# Patient Record
Sex: MALE | URBAN - METROPOLITAN AREA
[De-identification: names, ages, dates, MRNs, and addresses within clinical notes are randomized per-mention and may not be internally consistent; named-entity substitution may affect disease eponyms.]

---

## 2020-08-06 ENCOUNTER — APPOINTMENT (RX ONLY)
Dept: URBAN - METROPOLITAN AREA CLINIC 374 | Facility: CLINIC | Age: 40
Setting detail: DERMATOLOGY
End: 2020-08-06

## 2020-08-06 DIAGNOSIS — L20.89 OTHER ATOPIC DERMATITIS: ICD-10-CM

## 2020-08-06 PROCEDURE — ? COUNSELING

## 2020-08-06 PROCEDURE — ? PRESCRIPTION MEDICATION MANAGEMENT

## 2020-08-06 PROCEDURE — ? PRESCRIPTION

## 2020-08-06 PROCEDURE — 99202 OFFICE O/P NEW SF 15 MIN: CPT

## 2020-08-06 RX ORDER — TRIAMCINOLONE ACETONIDE 1 MG/G
1 OINTMENT TOPICAL BID
Qty: 1 | Refills: 3 | Status: ERX | COMMUNITY
Start: 2020-08-06

## 2020-08-06 RX ORDER — AMMONIUM LACTATE 12 G/100G
1 LOTION TOPICAL BID
Qty: 1 | Refills: 2 | Status: ERX | COMMUNITY
Start: 2020-08-06

## 2020-08-06 RX ADMIN — TRIAMCINOLONE ACETONIDE 1: 1 OINTMENT TOPICAL at 00:00

## 2020-08-06 RX ADMIN — AMMONIUM LACTATE 1: 12 LOTION TOPICAL at 00:00

## 2020-08-06 ASSESSMENT — LOCATION ZONE DERM: LOCATION ZONE: LEG

## 2020-08-06 ASSESSMENT — LOCATION SIMPLE DESCRIPTION DERM
LOCATION SIMPLE: LEFT PRETIBIAL REGION
LOCATION SIMPLE: RIGHT PRETIBIAL REGION

## 2020-08-06 ASSESSMENT — LOCATION DETAILED DESCRIPTION DERM
LOCATION DETAILED: LEFT PROXIMAL PRETIBIAL REGION
LOCATION DETAILED: RIGHT DISTAL PRETIBIAL REGION
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
## 2020-08-06 NOTE — PROCEDURE: PRESCRIPTION MEDICATION MANAGEMENT
Initiate Treatment: TAC 0.1% ointment bid to lower legs\\nAmmonium lactate 12% lotion bid to legs after TAC ointment
Detail Level: Simple
Render In Strict Bullet Format?: No

## 2020-10-15 ENCOUNTER — APPOINTMENT (RX ONLY)
Dept: URBAN - METROPOLITAN AREA CLINIC 374 | Facility: CLINIC | Age: 40
Setting detail: DERMATOLOGY
End: 2020-10-15

## 2020-10-15 DIAGNOSIS — L20.89 OTHER ATOPIC DERMATITIS: ICD-10-CM | Status: IMPROVED

## 2020-10-15 PROCEDURE — ? PRESCRIPTION MEDICATION MANAGEMENT

## 2020-10-15 PROCEDURE — 99213 OFFICE O/P EST LOW 20 MIN: CPT

## 2020-10-15 PROCEDURE — ? PHOTO-DOCUMENTATION

## 2020-10-15 PROCEDURE — ? PRESCRIPTION

## 2020-10-15 PROCEDURE — ? MEDICATION COUNSELING

## 2020-10-15 RX ORDER — TACROLIMUS 1 MG/G
OINTMENT TOPICAL BID
Qty: 1 | Refills: 3 | Status: ERX | COMMUNITY
Start: 2020-10-15

## 2020-10-15 RX ADMIN — TACROLIMUS: 1 OINTMENT TOPICAL at 00:00

## 2020-10-15 ASSESSMENT — LOCATION DETAILED DESCRIPTION DERM
LOCATION DETAILED: LEFT PROXIMAL PRETIBIAL REGION
LOCATION DETAILED: RIGHT DISTAL PRETIBIAL REGION

## 2020-10-15 ASSESSMENT — LOCATION SIMPLE DESCRIPTION DERM
LOCATION SIMPLE: RIGHT PRETIBIAL REGION
LOCATION SIMPLE: LEFT PRETIBIAL REGION

## 2020-10-15 ASSESSMENT — LOCATION ZONE DERM: LOCATION ZONE: LEG

## 2020-10-15 NOTE — PROCEDURE: MEDICATION COUNSELING
Xellachellez Pregnancy And Lactation Text: This medication is Pregnancy Category D and is not considered safe during pregnancy.  The risk during breast feeding is also uncertain.

## 2020-10-15 NOTE — PROCEDURE: PRESCRIPTION MEDICATION MANAGEMENT
Continue Regimen: TAC 0.1% ointment to lower legs prn worsening of eczema \\nAmmonium lactate 12% lotion bid to legs after TAC ointment
Initiate Treatment: Protopic 0.1% topical ointment bid to the eczema of legs
Detail Level: Simple
Render In Strict Bullet Format?: No

## 2020-10-15 NOTE — PROCEDURE: MEDICATION COUNSELING
24 Cimzia Pregnancy And Lactation Text: This medication crosses the placenta but can be considered safe in certain situations. Cimzia may be excreted in breast milk.

## 2020-10-15 NOTE — PROCEDURE: MEDICATION COUNSELING
80 Dupixent Counseling: I discussed with the patient the risks of dupilumab including but not limited to eye infection and irritation, cold sores, injection site reactions, worsening of asthma, allergic reactions and increased risk of parasitic infection.  Live vaccines should be avoided while taking dupilumab. Dupilumab will also interact with certain medications such as warfarin and cyclosporine. The patient understands that monitoring is required and they must alert us or the primary physician if symptoms of infection or other concerning signs are noted.

## 2020-10-21 ENCOUNTER — RX ONLY (OUTPATIENT)
Age: 40
Setting detail: RX ONLY
End: 2020-10-21

## 2020-10-21 RX ORDER — CRISABOROLE 20 MG/G
OINTMENT TOPICAL BID
Qty: 1 | Refills: 3 | Status: ERX | COMMUNITY
Start: 2020-10-21

## 2020-10-22 ENCOUNTER — RX ONLY (OUTPATIENT)
Age: 40
Setting detail: RX ONLY
End: 2020-10-22

## 2020-10-22 RX ORDER — PIMECROLIMUS 10 MG/G
CREAM TOPICAL
Qty: 1 | Refills: 3 | Status: ERX | COMMUNITY
Start: 2020-10-22

## 2021-03-18 ENCOUNTER — APPOINTMENT (RX ONLY)
Dept: URBAN - METROPOLITAN AREA CLINIC 374 | Facility: CLINIC | Age: 41
Setting detail: DERMATOLOGY
End: 2021-03-18

## 2021-03-18 DIAGNOSIS — L20.89 OTHER ATOPIC DERMATITIS: ICD-10-CM | Status: IMPROVED

## 2021-03-18 PROCEDURE — 99213 OFFICE O/P EST LOW 20 MIN: CPT

## 2021-03-18 PROCEDURE — ? PRESCRIPTION MEDICATION MANAGEMENT

## 2021-03-18 PROCEDURE — ? PRESCRIPTION

## 2021-03-18 RX ORDER — TACROLIMUS 1 MG/G
OINTMENT TOPICAL BID
Qty: 1 | Refills: 3 | Status: ERX

## 2021-03-18 ASSESSMENT — LOCATION DETAILED DESCRIPTION DERM
LOCATION DETAILED: RIGHT DISTAL PRETIBIAL REGION
LOCATION DETAILED: LEFT PROXIMAL PRETIBIAL REGION

## 2021-03-18 ASSESSMENT — LOCATION SIMPLE DESCRIPTION DERM
LOCATION SIMPLE: LEFT PRETIBIAL REGION
LOCATION SIMPLE: RIGHT PRETIBIAL REGION

## 2021-03-18 ASSESSMENT — SEVERITY ASSESSMENT 2020: SEVERITY 2020: ALMOST CLEAR

## 2021-03-18 ASSESSMENT — LOCATION ZONE DERM: LOCATION ZONE: LEG

## 2021-03-18 NOTE — PROCEDURE: PRESCRIPTION MEDICATION MANAGEMENT
Continue Regimen: Protopic 0.1% topical ointment bid to the eczema of legs\\nTAC 0.1% ointment to lower legs prn worsening of eczema \\nAmmonium lactate 12% lotion bid to legs after TAC ointment
Detail Level: Simple
Render In Strict Bullet Format?: No

## 2021-08-12 ENCOUNTER — APPOINTMENT (RX ONLY)
Dept: URBAN - METROPOLITAN AREA CLINIC 374 | Facility: CLINIC | Age: 41
Setting detail: DERMATOLOGY
End: 2021-08-12

## 2021-08-12 ENCOUNTER — RX ONLY (OUTPATIENT)
Age: 41
Setting detail: RX ONLY
End: 2021-08-12

## 2021-08-12 DIAGNOSIS — L259 CONTACT DERMATITIS AND OTHER ECZEMA, UNSPECIFIED CAUSE: ICD-10-CM | Status: IMPROVED

## 2021-08-12 PROBLEM — L30.8 OTHER SPECIFIED DERMATITIS: Status: ACTIVE | Noted: 2021-08-12

## 2021-08-12 PROCEDURE — 99213 OFFICE O/P EST LOW 20 MIN: CPT

## 2021-08-12 PROCEDURE — ? PRESCRIPTION MEDICATION MANAGEMENT

## 2021-08-12 RX ORDER — TACROLIMUS 1 MG/G
OINTMENT TOPICAL BID
Qty: 1 | Refills: 3 | Status: ERX | COMMUNITY
Start: 2021-08-12

## 2021-08-12 RX ORDER — AMMONIUM LACTATE 12 G/100G
1 LOTION TOPICAL BID
Qty: 1 | Refills: 2 | Status: ERX

## 2021-08-12 ASSESSMENT — LOCATION SIMPLE DESCRIPTION DERM
LOCATION SIMPLE: LEFT PRETIBIAL REGION
LOCATION SIMPLE: RIGHT PRETIBIAL REGION

## 2021-08-12 ASSESSMENT — LOCATION ZONE DERM: LOCATION ZONE: LEG

## 2021-08-12 ASSESSMENT — LOCATION DETAILED DESCRIPTION DERM
LOCATION DETAILED: RIGHT DISTAL PRETIBIAL REGION
LOCATION DETAILED: LEFT PROXIMAL PRETIBIAL REGION

## 2021-08-12 NOTE — PROCEDURE: PRESCRIPTION MEDICATION MANAGEMENT
Continue Regimen: Protopic 0.1% topical ointment bid to the eczema of legs\\n\\nAmmonium lactate 12% lotion bid to legs after TAC ointment
Discontinue Regimen: TAC 0.1% ointment to lower legs prn worsening of eczema
Detail Level: Simple
Render In Strict Bullet Format?: No

## 2021-12-20 ENCOUNTER — RX ONLY (OUTPATIENT)
Age: 41
Setting detail: RX ONLY
End: 2021-12-20

## 2021-12-20 RX ORDER — TACROLIMUS 1 MG/G
1 OINTMENT TOPICAL BID
Qty: 30 | Refills: 2 | Status: ERX

## 2021-12-20 RX ORDER — AMMONIUM LACTATE 12 G/100G
1 LOTION TOPICAL BID
Qty: 400 | Refills: 0 | Status: ERX

## 2021-12-20 RX ORDER — PIMECROLIMUS 10 MG/G
1 CREAM TOPICAL BID
Qty: 60 | Refills: 2 | Status: ERX

## 2021-12-20 RX ORDER — CRISABOROLE 20 MG/G
1 OINTMENT TOPICAL BID
Qty: 60 | Refills: 0 | Status: CANCELLED | COMMUNITY
Start: 2021-12-20

## 2022-04-26 ENCOUNTER — RX ONLY (OUTPATIENT)
Age: 42
Setting detail: RX ONLY
End: 2022-04-26

## 2022-04-26 RX ORDER — TACROLIMUS 1 MG/G
OINTMENT TOPICAL BID
Qty: 30 | Refills: 2 | Status: ERX

## 2022-05-11 ENCOUNTER — APPOINTMENT (RX ONLY)
Dept: URBAN - METROPOLITAN AREA CLINIC 374 | Facility: CLINIC | Age: 42
Setting detail: DERMATOLOGY
End: 2022-05-11

## 2022-05-11 ENCOUNTER — RX ONLY (OUTPATIENT)
Age: 42
Setting detail: RX ONLY
End: 2022-05-11

## 2022-05-11 DIAGNOSIS — L259 CONTACT DERMATITIS AND OTHER ECZEMA, UNSPECIFIED CAUSE: ICD-10-CM | Status: IMPROVED

## 2022-05-11 PROBLEM — L30.8 OTHER SPECIFIED DERMATITIS: Status: ACTIVE | Noted: 2022-05-11

## 2022-05-11 PROCEDURE — ? PRESCRIPTION MEDICATION MANAGEMENT

## 2022-05-11 PROCEDURE — 99213 OFFICE O/P EST LOW 20 MIN: CPT

## 2022-05-11 PROCEDURE — ? COUNSELING

## 2022-05-11 RX ORDER — AMMONIUM LACTATE 12 G/100G
1 LOTION TOPICAL BID
Qty: 400 | Refills: 0 | Status: ERX

## 2022-05-11 RX ORDER — TACROLIMUS 1 MG/G
OINTMENT TOPICAL BID
Qty: 30 | Refills: 2 | Status: ERX

## 2022-05-11 ASSESSMENT — LOCATION SIMPLE DESCRIPTION DERM
LOCATION SIMPLE: LEFT PRETIBIAL REGION
LOCATION SIMPLE: RIGHT PRETIBIAL REGION

## 2022-05-11 ASSESSMENT — LOCATION DETAILED DESCRIPTION DERM
LOCATION DETAILED: RIGHT DISTAL PRETIBIAL REGION
LOCATION DETAILED: LEFT PROXIMAL PRETIBIAL REGION

## 2022-05-11 ASSESSMENT — LOCATION ZONE DERM: LOCATION ZONE: LEG

## 2022-05-11 NOTE — PROCEDURE: PRESCRIPTION MEDICATION MANAGEMENT
Continue Regimen: Protopic 0.1% topical ointment bid to the eczema of legs\\nAmmonium lactate 12% lotion bid to legs after TAC ointment
Detail Level: Simple
Render In Strict Bullet Format?: No

## 2022-05-23 ENCOUNTER — RX ONLY (OUTPATIENT)
Age: 42
Setting detail: RX ONLY
End: 2022-05-23

## 2022-05-23 RX ORDER — PIMECROLIMUS 10 MG/G
CREAM TOPICAL BID
Qty: 60 | Refills: 2 | Status: ERX

## 2022-05-31 ENCOUNTER — RX ONLY (OUTPATIENT)
Age: 42
Setting detail: RX ONLY
End: 2022-05-31

## 2022-05-31 RX ORDER — PIMECROLIMUS 10 MG/G
CREAM TOPICAL BID
Qty: 60 | Refills: 2 | Status: ERX

## 2022-08-23 ENCOUNTER — RX ONLY (OUTPATIENT)
Age: 42
Setting detail: RX ONLY
End: 2022-08-23

## 2022-08-23 RX ORDER — TACROLIMUS 1 MG/G
1 OINTMENT TOPICAL QDAY
Qty: 60 | Refills: 3 | Status: ERX

## 2022-08-30 ENCOUNTER — TRANSCRIBE ORDERS (OUTPATIENT)
Dept: SCHEDULING | Age: 42
End: 2022-08-30

## 2022-08-30 DIAGNOSIS — I89.0 LYMPHEDEMA: Primary | ICD-10-CM

## 2022-09-01 ENCOUNTER — HOSPITAL ENCOUNTER (OUTPATIENT)
Dept: PHYSICAL THERAPY | Age: 42
Setting detail: THERAPIES SERIES
Discharge: HOME | End: 2022-09-01
Attending: PODIATRIST
Payer: COMMERCIAL

## 2022-09-01 DIAGNOSIS — I89.0 LYMPHEDEMA: ICD-10-CM

## 2022-09-01 PROCEDURE — 97530 THERAPEUTIC ACTIVITIES: CPT | Mod: GP,U8

## 2022-09-01 PROCEDURE — 97162 PT EVAL MOD COMPLEX 30 MIN: CPT | Mod: GP,U8

## 2022-09-01 RX ORDER — LISINOPRIL 10 MG/1
10 TABLET ORAL
COMMUNITY
Start: 2022-07-06

## 2022-09-01 RX ORDER — TACROLIMUS 1 MG/G
1 OINTMENT TOPICAL 2 TIMES DAILY
COMMUNITY
Start: 2022-03-07

## 2022-09-01 RX ORDER — ATORVASTATIN CALCIUM 40 MG/1
40 TABLET, FILM COATED ORAL
COMMUNITY
Start: 2022-07-06

## 2022-09-01 RX ORDER — TACROLIMUS 1 MG/G
1 OINTMENT TOPICAL 2 TIMES DAILY
COMMUNITY
Start: 2022-08-23

## 2022-09-01 RX ORDER — METFORMIN HYDROCHLORIDE 500 MG/1
1000 TABLET ORAL
COMMUNITY
Start: 2022-08-10

## 2022-09-01 RX ORDER — AMMONIUM LACTATE 12 G/100G
1 LOTION TOPICAL 2 TIMES DAILY
COMMUNITY
Start: 2022-05-11

## 2022-09-01 NOTE — Clinical Note
[address]  [facility] [address]      Dear DR. Juarez,      Thank you for this referral. Please review the attached notes and plan of care for your approval.  Please contact our department with any questions.     Sincerely,     Jacqui Powell, PT        Referring Provider: By co-signing this Plan of Care (POC) either electronically or physically you agree to the following:    I have reviewed the the Plan of Care established by the therapist within this document and certify that the services are skilled and medically necessary. I have reviewed the plan and recommend that these services continue to meet the goals stated in this document.       EXTERNAL PROVIDER FAXING BACK:    PHYSICIAN SIGNATURE: __________________________________     DATE: ___________________  TIME: _____________    IMPORTANT:  If returning this Plan of Care by fax, please fax back ONLY the signature page.   _________________________________________________________________________      [facility] Fax: [phone]        PT EVALUATION FOR OUTPATIENT THERAPY    Patient: Ramirez Montero  MRN: [id number]  : 1980 41 y.o.   Referring Physician: Sanya Juarez Jr., *  Date of Visit: 2022      Certification Dates:  22 through 10/27/22         Recommended Frequency & Duration:  2 times/week for up to 8 weeks     Diagnosis:   1. Lymphedema        Chief Complaints:   Chief Complaint   Patient presents with    Other     Lymphedema    Pain    Difficulty Walking    Decreased Mobility    Decreased recreational/play activity     Difficulty Performing Work/school Tasks       Precautions: no known precautions/restrictions    Past Medical History:   Past Medical History:   Diagnosis Date    Diabetes mellitus type I (CMS/HCC)        Past Surgical History:   Past Surgical History:   Procedure Laterality Date    GASTRIC BYPASS  2022         LEARNING ASSESSMENT    Assessment completed:  Yes    Learner name:   Ramirez    Learner: Patient    Learning Barriers:  Learning barriers: No Barriers    Preferred Language: English     Needed: No    Education Provided:   Method: Discussion  Readiness: acceptance  Response: Verbalizes understanding      CO-LEARNER ASSESSMENT:    Completed: No            OBJECTIVE MEASUREMENTS/DATA:    Time In Session:  Start Time: 1500  Stop Time: 1556  Time Calculation (min): 56 min   Assessment and Plan - 09/01/22 1453        Assessment    Plan of Care reviewed and patient/family in agreement Yes     System Pathology/Pathophysiology Noted musculoskeletal;integumentary     Functional Limitations in Following Categories (PT Eval) self-care;home management;work;community/leisure     Rehab Potential/Prognosis good, to achieve stated therapy goals     Problem List pain;other (see comments)   Lymphedema    Clinical Assessment Patient is a 41 year old male s/p gastric bypass surgery on 3/3/22 presenting with c/o B LE edema.  Upon examination, there is increased overall limb volume of B lower legs and feet, L > R.  Skin is warm, dry and intact although there are 3 healed scabbed areas along posterior aspect of L distal lower leg.  Patient recalls those were blisters caused by the friction of his swollen legs against his jeans.  There is 1+ pitting in B lower legs, and dorsum of feet, but toes are spared.  Stemmer's sign positive.  No signs of infection at this time.  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  Tushar will benefit from CDT including skin care, compression strategies, MLD and ongoing education for long term care and maintenance of his B lower legs.     Plan and Recommendations Email [facility] re: coverage for Circaid reduction kit.     Planned Services CPT 34886 Manual therapy;CPT 05373 Self-care/Home management training;CPT 60810 Therapeutic activities;CPT 87925 Therapeutic exercises                General Information -  09/01/22 1453        Session Details    Document Type initial evaluation     Mode of Treatment individual therapy;physical therapy     Patient/Family/Caregiver Comments/Observations Patient presents with c/o B LE edema        General Information    Onset of Illness/Injury or Date of Surgery 03/03/22     Referring Physician Dr. Juarez, [facility]     History of present illness/functional impairment Patient presents with c/o B LE edema that has become more evident since his gastric bypass surgery in March 2022.  He has since lost 100 lbs.  He recently saw his podiatrist who recommend consultation with a CLT.  Patient reports his edema has been there for a few years and is isolated to below the knees.  He states that his edema is effecting his work (works for [employer] long shifts) as his legs get hard and painful when he stands for a long time.  Tushar does state that his legs look best first thing in the morning or when he has a day off and can elevate his legs.  He has never used compression socks to date.     Existing Precautions/Restrictions no known precautions/restrictions     Precautions comments Hx of DM                Pain/Vitals - 09/01/22 1453        Pain Assessment    Currently in pain Yes     Preferred Pain Scale number (Numeric Rating Pain Scale)     Pain Side/Orientation bilateral     Pain: Body location Leg     Pain Rating (0-10): Pre Activity 3     Pain Rating (0-10): Activity 7     Pain Rating (0-10): Post Activity 3     Pain Description constant;aching;throbbing        Pain Intervention    Intervention  See assessment     Post Intervention Comments See assessment                Falls - 09/01/22 1453        Initial Falls Assessment    One or more falls in the last year No                PT - 09/01/22 1453        Physical Therapy    Physical Therapy Specialty Lymphedema Program        PT Plan    Frequency of treatment 2 times/week     PT Duration 8 weeks     PT Cert From 09/01/22     PT Cert To 10/27/22     Date  PT POC was sent to provider 09/01/22     Signed PT Plan of Care received?  No                   Living Environment    Living Environment - 09/01/22 1453        Living Environment    People in Home spouse     Living Arrangements apartment     Transportation Concerns car, none               PLOF:    Prior Level of Function - 09/01/22 1453        OTHER    Previous level of function Fishing, bike riding,               Lymphedema     Lymphedema Assessment - 09/01/22 1400        Skin    Skin Condition Intact     Notable findings 3 scabbed, dry areas posterior aspect of L lower leg - patient reports they are healed blisters from skin rubbing against his jeans when he swells     Odor comments NONE     Sensation comments Intact LT        Palpation    LE Palpation  Mild pitting edema B lower legs and dorsum of feet; toes currently spared.        Outcome Measures    LLIS results/comments LLIS 2 = 34        Extremity Measurements    Volume Measurements B/L LE        Affected LE Measurements    Affected limb laterality Right     MTP 25.8 cm     -8 cm 29.8 cm     0 cm 37.5 cm     4 cm 57 cm     8 cm 58.7 cm     12 cm 61.4 cm     16 cm 65 cm     20 cm 69.8 cm     24 cm 70.8 cm     28 cm 67.3 cm     32 cm 62.9 cm     Affected LE Total Volume (ml) 996328.85 ml        Other Affected LE Circumference    Unaffected limb laterality Left     MTP 26 cm     -8 cm 30.6 cm     0 cm 38.8 cm     4 cm 53.5 cm     8 cm 56.5 cm     12 cm 64 cm     16 cm 71 cm     20 cm 76.4 cm     24 cm 77.7 cm     28 cm 76.8 cm     32 cm 65.8 cm     Other Affected LE Total Volume (ml) 870644.84 ml        Interventions    Intervention Type Garments        Garments    Day use Circaid reduction kit                  Goals       <enter goal here> (pt-stated)       Mutually agreed upon pain goal       Mutually agreed upon pain goal: Patient will have reduced LE pain and heaviness during work shifts 12 hours or more for 1 week to 2/10 max.          PT Lymph Goals        Patient will be able to teach back 3 skin care practices for infection reduction risk. Short Term 3 weeks    Patient will be able to tolerate L LE compression as recommended for 10 hours + a day. Short Term 3 weeks    Patient will demonstrate a L LE volume reduction of at least 10 %. Short Term 4 weeks    Patient will have reduced R LE volume by 5% or more Short Term 6 weeks    Patient will be Ind with skin care practices in order to reduce infection risk of B LE and avoid hospitalizations for cellulits. Long Term 8 weeks    Patient will be Ind with use of appropriate compression strategies to maintain B LE limb status and avoid progression of lymphedema to next stage. Long Term 8 weeks    Patient will have improved LLIS score of 20% impairment or better. Long Term 8 weeks                      TREATMENT PLAN:            ASSESSMENT:    This 41 y.o. year old male presents to PT with above stated diagnosis. Physical Therapy evaluation reveals pain, other (see comments) (Lymphedema) resulting in self-care, home management, work, community/leisure limitations. Ramirez Montero will benefit from skilled PT services to address limitation, work towards rehab and patient goals and maximize PLOF of chosen ADLs.     Planned Services: The patient's treatment will include CPT 00708 Manual therapy, CPT 24262 Self-care/Home management training, CPT 71808 Therapeutic activities, CPT 33893 Therapeutic exercises, .

## 2022-09-01 NOTE — LETTER
[address]  [place] [facility] [place] [address]  [facility] Fax: [phone]    PHYSICAL THERAPY PLAN OF CARE    Patient Name: Ramirez Montero    Certification Dates:  From 22  To: 10/27/22  Frequency: 2 times/week Duration: 8 weeks  Other:      Provider: Jacqui Powell PT     Referring Provider: Sanya Juarez Jr., *  PCP: Argentina Carr MD        Payor: Payor: [employer] / Plan: [employer] / Product Type: [place] Managed Care /   Medical Diagnosis: No primary diagnosis found.     Rehab Potential: good, to achieve stated therapy goals    Planned Services: The patient's treatment will include CPT 52279 Manual therapy, CPT 39939 Self-care/Home management training, CPT 74033 Therapeutic activities, CPT 90047 Therapeutic exercises, .     By signing this plan of care, I certify this plan of care as correct and necessary for the patient.        Physician Signature: _________________________________________ Date: _________________    Thank you for this referral. Please contact our department with any questions.      Jacqui Powell PT          Referring Provider: By co-signing this Plan of Care (POC) either electronically or physically you agree to the following:    I have reviewed the the Plan of Care established by the therapist within this document and certify that the services are skilled and medically necessary. I have reviewed the plan and recommend that these services continue to meet the goals stated in this document.       EXTERNAL PROVIDER FAXING BACK:    PHYSICIAN SIGNATURE: __________________________________     DATE: ___________________  TIME: _____________    IMPORTANT:  If returning this Plan of Care by fax, please fax back ONLY the signature page.   _________________________________________________________________________      [facility] Fax: [phone]        PT EVALUATION FOR OUTPATIENT THERAPY    Patient: Ramirez Montero  MRN: [id number]  : 1980 41  [place]   Referring Physician: Sanya Juarez Jr., *  Date of Visit: 9/1/2022      Certification Dates:  09/01/22 through 10/27/22         Recommended Frequency & Duration:  2 times/week for up to 8 weeks     Diagnosis:   1. Lymphedema        Chief Complaints:   Chief Complaint   Patient presents with    Other     Lymphedema    Pain    Difficulty Walking    Decreased Mobility    Decreased recreational/play activity     Difficulty Performing Work/school Tasks       Precautions: no known precautions/restrictions    Past Medical History:   Past Medical History:   Diagnosis Date    Diabetes mellitus type I (CMS/HCC)        Past Surgical History:   Past Surgical History:   Procedure Laterality Date    GASTRIC BYPASS  03/02/2022         LEARNING ASSESSMENT    Assessment completed:  Yes    Learner name:  Ramirez    Learner: Patient    Learning Barriers:  Learning barriers: No Barriers    Preferred Language: English     Needed: No    Education Provided:   Method: Discussion  Readiness: acceptance  Response: Verbalizes understanding      CO-LEARNER ASSESSMENT:    Completed: No            OBJECTIVE MEASUREMENTS/DATA:    Time In Session:  Start Time: 1500  Stop Time: 1556  Time Calculation (min): 56 min   Assessment and Plan - 09/01/22 1453        Assessment    Plan of Care reviewed and patient/family in agreement Yes     System Pathology/Pathophysiology Noted musculoskeletal;integumentary     Functional Limitations in Following Categories (PT Eval) self-care;home management;work;community/leisure     Rehab Potential/Prognosis good, to achieve stated therapy goals     Problem List pain;other (see comments)   Lymphedema    Clinical Assessment Patient is a 41 year old male s/p gastric bypass surgery on 3/3/22 presenting with c/o B LE edema.  Upon examination, there is increased overall limb volume of B lower legs and feet, L > R.  Skin is warm, dry and intact although there are 3 healed scabbed areas along  posterior aspect of L distal lower leg.  Patient recalls those were blisters caused by the friction of his swollen legs against his jeans.  There is 1+ pitting in B lower legs, and dorsum of feet, but toes are spared.  Stemmer's sign positive.  No signs of infection at this time.  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  Tushar will benefit from CDT including skin care, compression strategies, MLD and ongoing education for long term care and maintenance of his B lower legs.     Plan and Recommendations Email [facility] re: coverage for Circaid reduction kit.     Planned Services CPT 04389 Manual therapy;CPT 18135 Self-care/Home management training;CPT 23044 Therapeutic activities;CPT 86163 Therapeutic exercises                General Information - 09/01/22 0349        Session Details    Document Type initial evaluation     Mode of Treatment individual therapy;physical therapy     Patient/Family/Caregiver Comments/Observations Patient presents with c/o B LE edema        General Information    Onset of Illness/Injury or Date of Surgery 03/03/22     Referring Physician Dr. Juarez, [facility]     History of present illness/functional impairment Patient presents with c/o B LE edema that has become more evident since his gastric bypass surgery in March 2022.  He has since lost 100 lbs.  He recently saw his podiatrist who recommend consultation with a CLT.  Patient reports his edema has been there for a few years and is isolated to below the knees.  He states that his edema is effecting his work (works for [employer] long shifts) as his legs get hard and painful when he stands for a long time.  Tushar does state that his legs look best first thing in the morning or when he has a day off and can elevate his legs.  He has never used compression socks to date.     Existing Precautions/Restrictions no known precautions/restrictions     Precautions comments Hx of DM                 Pain/Vitals - 09/01/22 1453        Pain Assessment    Currently in pain Yes     Preferred Pain Scale number (Numeric Rating Pain Scale)     Pain Side/Orientation bilateral     Pain: Body location Leg     Pain Rating (0-10): Pre Activity 3     Pain Rating (0-10): Activity 7     Pain Rating (0-10): Post Activity 3     Pain Description constant;aching;throbbing        Pain Intervention    Intervention  See assessment     Post Intervention Comments See assessment                Falls - 09/01/22 1453        Initial Falls Assessment    One or more falls in the last year No                PT - 09/01/22 1453        Physical Therapy    Physical Therapy Specialty Lymphedema Program        PT Plan    Frequency of treatment 2 times/week     PT Duration 8 weeks     PT Cert From 09/01/22     PT Cert To 10/27/22     Date PT POC was sent to provider 09/01/22     Signed PT Plan of Care received?  No                   Living Environment    Living Environment - 09/01/22 1453        Living Environment    People in Home spouse     Living Arrangements apartment     Transportation Concerns car, none               PLOF:    Prior Level of Function - 09/01/22 1453        OTHER    Previous level of function Fishing, bike riding,               Lymphedema     Lymphedema Assessment - 09/01/22 1400        Skin    Skin Condition Intact     Notable findings 3 scabbed, dry areas posterior aspect of L lower leg - patient reports they are healed blisters from skin rubbing against his jeans when he swells     Odor comments NONE     Sensation comments Intact LT        Palpation    LE Palpation  Mild pitting edema B lower legs and dorsum of feet; toes currently spared.        Outcome Measures    LLIS results/comments LLIS 2 = 34        Extremity Measurements    Volume Measurements B/L LE        Affected LE Measurements    Affected limb laterality Right     MTP 25.8 cm     -8 cm 29.8 cm     0 cm 37.5 cm     4 cm 57 cm     8 cm 58.7 cm     12 cm 61.4 cm      16 cm 65 cm     20 cm 69.8 cm     24 cm 70.8 cm     28 cm 67.3 cm     32 cm 62.9 cm     Affected LE Total Volume (ml) 414979.85 ml        Other Affected LE Circumference    Unaffected limb laterality Left     MTP 26 cm     -8 cm 30.6 cm     0 cm 38.8 cm     4 cm 53.5 cm     8 cm 56.5 cm     12 cm 64 cm     16 cm 71 cm     20 cm 76.4 cm     24 cm 77.7 cm     28 cm 76.8 cm     32 cm 65.8 cm     Other Affected LE Total Volume (ml) 911973.84 ml        Interventions    Intervention Type Garments        Garments    Day use Circaid reduction kit                  Goals       <enter goal here> (pt-stated)       Mutually agreed upon pain goal       Mutually agreed upon pain goal: Patient will have reduced LE pain and heaviness during work shifts 12 hours or more for 1 week to 2/10 max.          PT Lymph Goals       Patient will be able to teach back 3 skin care practices for infection reduction risk. Short Term 3 weeks    Patient will be able to tolerate L LE compression as recommended for 10 hours + a day. Short Term 3 weeks    Patient will demonstrate a L LE volume reduction of at least 10 %. Short Term 4 weeks    Patient will have reduced R LE volume by 5% or more Short Term 6 weeks    Patient will be Ind with skin care practices in order to reduce infection risk of B LE and avoid hospitalizations for cellulits. Long Term 8 weeks    Patient will be Ind with use of appropriate compression strategies to maintain B LE limb status and avoid progression of lymphedema to next stage. Long Term 8 weeks    Patient will have improved LLIS score of 20% impairment or better. Long Term 8 weeks                      TREATMENT PLAN:            ASSESSMENT:    This 41 y.o. year old male presents to PT with above stated diagnosis. Physical Therapy evaluation reveals pain, other (see comments) (Lymphedema) resulting in self-care, home management, work, community/leisure limitations. Ramirez Montero will benefit from skilled PT services to  address limitation, work towards rehab and patient goals and maximize PLOF of chosen ADLs.     Planned Services: The patient's treatment will include CPT 62225 Manual therapy, CPT 52800 Self-care/Home management training, CPT 81601 Therapeutic activities, CPT 42678 Therapeutic exercises, .

## 2022-09-01 NOTE — PROGRESS NOTES
Referring Provider: By co-signing this Plan of Care (POC) either electronically or physically you agree to the following:    I have reviewed the the Plan of Care established by the therapist within this document and certify that the services are skilled and medically necessary. I have reviewed the plan and recommend that these services continue to meet the goals stated in this document.       EXTERNAL PROVIDER FAXING BACK:    PHYSICIAN SIGNATURE: __________________________________     DATE: ___________________  TIME: _____________    IMPORTANT:  If returning this Plan of Care by fax, please fax back ONLY the signature page.   _________________________________________________________________________      [facility] Fax: [phone]        PT EVALUATION FOR OUTPATIENT THERAPY    Patient: Ramirez Montero  MRN: [id number]  : 1980 41 y.o.   Referring Physician: Sanya Juarez Jr., *  Date of Visit: 2022      Certification Dates:  22 through 10/27/22         Recommended Frequency & Duration:  2 times/week for up to 8 weeks     Diagnosis:   1. Lymphedema        Chief Complaints:   Chief Complaint   Patient presents with    Other     Lymphedema    Pain    Difficulty Walking    Decreased Mobility    Decreased recreational/play activity     Difficulty Performing Work/school Tasks       Precautions: no known precautions/restrictions    Past Medical History:   Past Medical History:   Diagnosis Date    Diabetes mellitus type I (CMS/HCC)        Past Surgical History:   Past Surgical History:   Procedure Laterality Date    GASTRIC BYPASS  2022         LEARNING ASSESSMENT    Assessment completed:  Yes    Learner name:  Ramirez    Learner: Patient    Learning Barriers:  Learning barriers: No Barriers    Preferred Language: English     Needed: No    Education Provided:   Method: Discussion  Readiness: acceptance  Response: Verbalizes understanding      CO-LEARNER  ASSESSMENT:    Completed: No            OBJECTIVE MEASUREMENTS/DATA:    Time In Session:  Start Time: 1500  Stop Time: 1556  Time Calculation (min): 56 min   Assessment and Plan - 09/01/22 1453        Assessment    Plan of Care reviewed and patient/family in agreement Yes     System Pathology/Pathophysiology Noted musculoskeletal;integumentary     Functional Limitations in Following Categories (PT Eval) self-care;home management;work;community/leisure     Rehab Potential/Prognosis good, to achieve stated therapy goals     Problem List pain;other (see comments)   Lymphedema    Clinical Assessment Patient is a 41 year old male s/p gastric bypass surgery on 3/3/22 presenting with c/o B LE edema.  Upon examination, there is increased overall limb volume of B lower legs and feet, L > R.  Skin is warm, dry and intact although there are 3 healed scabbed areas along posterior aspect of L distal lower leg.  Patient recalls those were blisters caused by the friction of his swollen legs against his jeans.  There is 1+ pitting in B lower legs, and dorsum of feet, but toes are spared.  Stemmer's sign positive.  No signs of infection at this time.  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  Tushar will benefit from CDT including skin care, compression strategies, MLD and ongoing education for long term care and maintenance of his B lower legs.     Plan and Recommendations Email [facility] re: coverage for Circaid reduction kit.     Planned Services CPT 17323 Manual therapy;CPT 55012 Self-care/Home management training;CPT 17138 Therapeutic activities;CPT 22087 Therapeutic exercises                General Information - 09/01/22 5925        Session Details    Document Type initial evaluation     Mode of Treatment individual therapy;physical therapy     Patient/Family/Caregiver Comments/Observations Patient presents with c/o B LE edema        General Information    Onset of  Illness/Injury or Date of Surgery 03/03/22     Referring Physician Dr. Juarez, DPM     History of present illness/functional impairment Patient presents with c/o B LE edema that has become more evident since his gastric bypass surgery in March 2022.  He has since lost 100 lbs.  He recently saw his podiatrist who recommend consultation with a CLT.  Patient reports his edema has been there for a few years and is isolated to below the knees.  He states that his edema is effecting his work (works for [employer] long shifts) as his legs get hard and painful when he stands for a long time.  Tushar does state that his legs look best first thing in the morning or when he has a day off and can elevate his legs.  He has never used compression socks to date.     Existing Precautions/Restrictions no known precautions/restrictions     Precautions comments Hx of DM                Pain/Vitals - 09/01/22 1453        Pain Assessment    Currently in pain Yes     Preferred Pain Scale number (Numeric Rating Pain Scale)     Pain Side/Orientation bilateral     Pain: Body location Leg     Pain Rating (0-10): Pre Activity 3     Pain Rating (0-10): Activity 7     Pain Rating (0-10): Post Activity 3     Pain Description constant;aching;throbbing        Pain Intervention    Intervention  See assessment     Post Intervention Comments See assessment                Falls - 09/01/22 1453        Initial Falls Assessment    One or more falls in the last year No                PT - 09/01/22 1453        Physical Therapy    Physical Therapy Specialty Lymphedema Program        PT Plan    Frequency of treatment 2 times/week     PT Duration 8 weeks     PT Cert From 09/01/22     PT Cert To 10/27/22     Date PT POC was sent to provider 09/01/22     Signed PT Plan of Care received?  No                   Living Environment    Living Environment - 09/01/22 1453        Living Environment    People in Home spouse     Living Arrangements apartment     Transportation  Concerns car, none               PLOF:    Prior Level of Function - 09/01/22 1453        OTHER    Previous level of function Fishing, bike riding,               Lymphedema     Lymphedema Assessment - 09/01/22 1400        Skin    Skin Condition Intact     Notable findings 3 scabbed, dry areas posterior aspect of L lower leg - patient reports they are healed blisters from skin rubbing against his jeans when he swells     Odor comments NONE     Sensation comments Intact LT        Palpation    LE Palpation  Mild pitting edema B lower legs and dorsum of feet; toes currently spared.        Outcome Measures    LLIS results/comments LLIS 2 = 34        Extremity Measurements    Volume Measurements B/L LE        Affected LE Measurements    Affected limb laterality Right     MTP 25.8 cm     -8 cm 29.8 cm     0 cm 37.5 cm     4 cm 57 cm     8 cm 58.7 cm     12 cm 61.4 cm     16 cm 65 cm     20 cm 69.8 cm     24 cm 70.8 cm     28 cm 67.3 cm     32 cm 62.9 cm     Affected LE Total Volume (ml) 851269.85 ml        Other Affected LE Circumference    Unaffected limb laterality Left     MTP 26 cm     -8 cm 30.6 cm     0 cm 38.8 cm     4 cm 53.5 cm     8 cm 56.5 cm     12 cm 64 cm     16 cm 71 cm     20 cm 76.4 cm     24 cm 77.7 cm     28 cm 76.8 cm     32 cm 65.8 cm     Other Affected LE Total Volume (ml) 252294.84 ml        Interventions    Intervention Type Garments        Garments    Day use Circaid reduction kit                  Goals       <enter goal here> (pt-stated)       Mutually agreed upon pain goal       Mutually agreed upon pain goal: Patient will have reduced LE pain and heaviness during work shifts 12 hours or more for 1 week to 2/10 max.          PT Lymph Goals       Patient will be able to teach back 3 skin care practices for infection reduction risk. Short Term 3 weeks    Patient will be able to tolerate L LE compression as recommended for 10 hours + a day. Short Term 3 weeks    Patient will demonstrate a L LE  volume reduction of at least 10 %. Short Term 4 weeks    Patient will have reduced R LE volume by 5% or more Short Term 6 weeks    Patient will be Ind with skin care practices in order to reduce infection risk of B LE and avoid hospitalizations for cellulits. Long Term 8 weeks    Patient will be Ind with use of appropriate compression strategies to maintain B LE limb status and avoid progression of lymphedema to next stage. Long Term 8 weeks    Patient will have improved LLIS score of 20% impairment or better. Long Term 8 weeks                      TREATMENT PLAN:            ASSESSMENT:    This 41 y.o. year old male presents to PT with above stated diagnosis. Physical Therapy evaluation reveals pain, other (see comments) (Lymphedema) resulting in self-care, home management, work, community/leisure limitations. Ramirez Montero will benefit from skilled PT services to address limitation, work towards rehab and patient goals and maximize PLOF of chosen ADLs.     Planned Services: The patient's treatment will include CPT 79061 Manual therapy, CPT 31604 Self-care/Home management training, CPT 11131 Therapeutic activities, CPT 67177 Therapeutic exercises, .

## 2022-09-01 NOTE — Clinical Note
[address]  [facility] [address]  {[facility] Plan of Care Fax List:[phone]}    PHYSICAL THERAPY PLAN OF CARE    Patient Name: Ramirez Montero    Certification Dates:  From 22  To: 10/27/22  Frequency: 2 times/week Duration: 8 weeks  Other:      Provider: Jacqui Powell PT     Referring Provider: Sanya Juarez Jr., *  PCP: Argentina Carr MD        Payor: Payor: [employer] / Plan: [employer] / Product Type: [place] Managed Care /   Medical Diagnosis: No primary diagnosis found.     Rehab Potential: good, to achieve stated therapy goals    Planned Services: The patient's treatment will include CPT 20531 Manual therapy, CPT 02824 Self-care/Home management training, CPT 26912 Therapeutic activities, CPT 90884 Therapeutic exercises, .     By signing this plan of care, I certify this plan of care as correct and necessary for the patient.        Physician Signature: _________________________________________ Date: _________________    Thank you for this referral. Please contact our department with any questions.      Jacqui Powell PT          Referring Provider: By co-signing this Plan of Care (POC) either electronically or physically you agree to the following:    I have reviewed the the Plan of Care established by the therapist within this document and certify that the services are skilled and medically necessary. I have reviewed the plan and recommend that these services continue to meet the goals stated in this document.       EXTERNAL PROVIDER FAXING BACK:    PHYSICIAN SIGNATURE: __________________________________     DATE: ___________________  TIME: _____________    IMPORTANT:  If returning this Plan of Care by fax, please fax back ONLY the signature page.   _________________________________________________________________________      [facility] Fax: [phone]        PT EVALUATION FOR OUTPATIENT THERAPY    Patient: Ramirez Montero  MRN: [id number]  : 1980 41  [place]   Referring Physician: Sanya Juarez Jr., *  Date of Visit: 9/1/2022      Certification Dates:  09/01/22 through 10/27/22         Recommended Frequency & Duration:  2 times/week for up to 8 weeks     Diagnosis:   1. Lymphedema        Chief Complaints:   Chief Complaint   Patient presents with    Other     Lymphedema    Pain    Difficulty Walking    Decreased Mobility    Decreased recreational/play activity     Difficulty Performing Work/school Tasks       Precautions: no known precautions/restrictions    Past Medical History:   Past Medical History:   Diagnosis Date    Diabetes mellitus type I (CMS/HCC)        Past Surgical History:   Past Surgical History:   Procedure Laterality Date    GASTRIC BYPASS  03/02/2022         LEARNING ASSESSMENT    Assessment completed:  Yes    Learner name:  Ramirez    Learner: Patient    Learning Barriers:  Learning barriers: No Barriers    Preferred Language: English     Needed: No    Education Provided:   Method: Discussion  Readiness: acceptance  Response: Verbalizes understanding      CO-LEARNER ASSESSMENT:    Completed: No            OBJECTIVE MEASUREMENTS/DATA:    Time In Session:  Start Time: 1500  Stop Time: 1556  Time Calculation (min): 56 min   Assessment and Plan - 09/01/22 1453        Assessment    Plan of Care reviewed and patient/family in agreement Yes     System Pathology/Pathophysiology Noted musculoskeletal;integumentary     Functional Limitations in Following Categories (PT Eval) self-care;home management;work;community/leisure     Rehab Potential/Prognosis good, to achieve stated therapy goals     Problem List pain;other (see comments)   Lymphedema    Clinical Assessment Patient is a 41 year old male s/p gastric bypass surgery on 3/3/22 presenting with c/o B LE edema.  Upon examination, there is increased overall limb volume of B lower legs and feet, L > R.  Skin is warm, dry and intact although there are 3 healed scabbed areas along  posterior aspect of L distal lower leg.  Patient recalls those were blisters caused by the friction of his swollen legs against his jeans.  There is 1+ pitting in B lower legs, and dorsum of feet, but toes are spared.  Stemmer's sign positive.  No signs of infection at this time.  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  Tushar will benefit from CDT including skin care, compression strategies, MLD and ongoing education for long term care and maintenance of his B lower legs.     Plan and Recommendations Email [facility] re: coverage for Circaid reduction kit.     Planned Services CPT 19306 Manual therapy;CPT 33581 Self-care/Home management training;CPT 60779 Therapeutic activities;CPT 41063 Therapeutic exercises                General Information - 09/01/22 6720        Session Details    Document Type initial evaluation     Mode of Treatment individual therapy;physical therapy     Patient/Family/Caregiver Comments/Observations Patient presents with c/o B LE edema        General Information    Onset of Illness/Injury or Date of Surgery 03/03/22     Referring Physician Dr. Juarez, MAURICIO     History of present illness/functional impairment Patient presents with c/o B LE edema that has become more evident since his gastric bypass surgery in March 2022.  He has since lost 100 lbs.  He recently saw his podiatrist who recommend consultation with a CLT.  Patient reports his edema has been there for a few years and is isolated to below the knees.  He states that his edema is effecting his work (works for [employer] long shifts) as his legs get hard and painful when he stands for a long time.  Tushar does state that his legs look best first thing in the morning or when he has a day off and can elevate his legs.  He has never used compression socks to date.     Existing Precautions/Restrictions no known precautions/restrictions     Precautions comments Hx of DM                 Pain/Vitals - 09/01/22 1453        Pain Assessment    Currently in pain Yes     Preferred Pain Scale number (Numeric Rating Pain Scale)     Pain Side/Orientation bilateral     Pain: Body location Leg     Pain Rating (0-10): Pre Activity 3     Pain Rating (0-10): Activity 7     Pain Rating (0-10): Post Activity 3     Pain Description constant;aching;throbbing        Pain Intervention    Intervention  See assessment     Post Intervention Comments See assessment                Falls - 09/01/22 1453        Initial Falls Assessment    One or more falls in the last year No                PT - 09/01/22 1453        Physical Therapy    Physical Therapy Specialty Lymphedema Program        PT Plan    Frequency of treatment 2 times/week     PT Duration 8 weeks     PT Cert From 09/01/22     PT Cert To 10/27/22     Date PT POC was sent to provider 09/01/22     Signed PT Plan of Care received?  No                   Living Environment    Living Environment - 09/01/22 1453        Living Environment    People in Home spouse     Living Arrangements apartment     Transportation Concerns car, none               PLOF:    Prior Level of Function - 09/01/22 1453        OTHER    Previous level of function Fishing, bike riding,               Lymphedema     Lymphedema Assessment - 09/01/22 1400        Skin    Skin Condition Intact     Notable findings 3 scabbed, dry areas posterior aspect of L lower leg - patient reports they are healed blisters from skin rubbing against his jeans when he swells     Odor comments NONE     Sensation comments Intact LT        Palpation    LE Palpation  Mild pitting edema B lower legs and dorsum of feet; toes currently spared.        Outcome Measures    LLIS results/comments LLIS 2 = 34        Extremity Measurements    Volume Measurements B/L LE        Affected LE Measurements    Affected limb laterality Right     MTP 25.8 cm     -8 cm 29.8 cm     0 cm 37.5 cm     4 cm 57 cm     8 cm 58.7 cm     12 cm 61.4 cm      16 cm 65 cm     20 cm 69.8 cm     24 cm 70.8 cm     28 cm 67.3 cm     32 cm 62.9 cm     Affected LE Total Volume (ml) 536927.85 ml        Other Affected LE Circumference    Unaffected limb laterality Left     MTP 26 cm     -8 cm 30.6 cm     0 cm 38.8 cm     4 cm 53.5 cm     8 cm 56.5 cm     12 cm 64 cm     16 cm 71 cm     20 cm 76.4 cm     24 cm 77.7 cm     28 cm 76.8 cm     32 cm 65.8 cm     Other Affected LE Total Volume (ml) 365407.84 ml        Interventions    Intervention Type Garments        Garments    Day use Circaid reduction kit                  Goals       <enter goal here> (pt-stated)       Mutually agreed upon pain goal       Mutually agreed upon pain goal: Patient will have reduced LE pain and heaviness during work shifts 12 hours or more for 1 week to 2/10 max.          PT Lymph Goals       Patient will be able to teach back 3 skin care practices for infection reduction risk. Short Term 3 weeks    Patient will be able to tolerate L LE compression as recommended for 10 hours + a day. Short Term 3 weeks    Patient will demonstrate a L LE volume reduction of at least 10 %. Short Term 4 weeks    Patient will have reduced R LE volume by 5% or more Short Term 6 weeks    Patient will be Ind with skin care practices in order to reduce infection risk of B LE and avoid hospitalizations for cellulits. Long Term 8 weeks    Patient will be Ind with use of appropriate compression strategies to maintain B LE limb status and avoid progression of lymphedema to next stage. Long Term 8 weeks    Patient will have improved LLIS score of 20% impairment or better. Long Term 8 weeks                      TREATMENT PLAN:            ASSESSMENT:    This 41 y.o. year old male presents to PT with above stated diagnosis. Physical Therapy evaluation reveals pain, other (see comments) (Lymphedema) resulting in self-care, home management, work, community/leisure limitations. Ramirez Montero will benefit from skilled PT services to  address limitation, work towards rehab and patient goals and maximize PLOF of chosen ADLs.     Planned Services: The patient's treatment will include CPT 13206 Manual therapy, CPT 63424 Self-care/Home management training, CPT 47955 Therapeutic activities, CPT 23802 Therapeutic exercises, .

## 2022-09-01 NOTE — OP PT TREATMENT LOG
..LYMPHEDEMA PT FLOWSHEET    PT Lymph Exercises Current Session Time   MODALITIES TOTAL TIME FOR SESSION Not performed   Heat (CPT 83658)    Vasocompression/Ice (CPT 14328)    Estim/H-Wave (CPT 62075)    Mechanical Traction (CPT 16145)    THER ACT  CPT 82624 TOTAL TIME FOR SESSION 8-22 Minutes   Bed Mobility    Transfer Training    Body Mechanics/Work Training    Patient Education Patient education on compression options including pros and cons: compression bandaging versus Circaid reduction kit for treatment phase.  Patient elects to trial reduction kit and start with L LE.   THER EX  CPT 15552 TOTAL TIME FOR SESSION Not performed   CARDIOVASCULAR    Nu Step    UBE    STRENGTHENING     Supine Ther-Ex    Standing Ther-Ex    Seated Ther-Ex    STRETCHING    Core Stabilization    LE Stretching    UE Stretching    Spinal Stretching    Postural     REPEATED MOVEMENTS    NEURO RE-ED  CPT 68257 TOTAL TIME FOR SESSION Not performed   COORDINATION    POSTURAL RE-ED    PRE-GAIT ACTIVITIES    BALANCE TRAINING    Sitting Balance    Standing Balance    KINESIOTAPE    GAIT TRAINING  CPT 28691 TOTAL TIME FOR SESSION Not performed   Ambulation     Dynamic Gait    MANUAL   CPT 26631 TOTAL TIME FOR SESSION Not performed   Stretching    Mobilization    Massage- Deep Tissue, Scar, Transverse Friction    Manual Lymph Drainage Focus on L LE initially   Skin Care- Lotion/Wrapping    Measurement/Fitting Circaid reduction kit   Skin Stretching/Mobilization for Cording      GROUP  CPT 61901 TOTAL TIME FOR SESSION Not performed

## 2022-09-13 ENCOUNTER — HOSPITAL ENCOUNTER (OUTPATIENT)
Dept: PHYSICAL THERAPY | Age: 42
Setting detail: THERAPIES SERIES
Discharge: HOME | End: 2022-09-13
Attending: PODIATRIST
Payer: COMMERCIAL

## 2022-09-13 DIAGNOSIS — I89.0 LYMPHEDEMA: Primary | ICD-10-CM

## 2022-09-13 PROCEDURE — 97140 MANUAL THERAPY 1/> REGIONS: CPT | Mod: GP,U8

## 2022-09-13 NOTE — PROGRESS NOTES
PT DAILY NOTE FOR OUTPATIENT THERAPY    Patient: Ramirez Montero MRN: [id number]  : 1980 41 y.o.  Referring Physician: Sanya Juarez Jr., *  Date of Visit: 2022    Certification Dates: 22 through 10/27/22    Diagnosis:   1. Lymphedema        Chief Complaints:  B LE lymphedema    Precautions:   Precautions comments: Hx of DM  Existing Precautions/Restrictions: no known precautions/restrictions     TODAY'S VISIT    Time In Session:  Start Time: 1550  Stop Time: 1646  Time Calculation (min): 56 min   History/Vitals/Pain/Encounter Info - 22 1547        Injury History/Precautions/Daily Required Info    Document Type daily treatment     Primary Therapist Jacqui Powell, PT, MSPT, CLT - [facility]     Chief Complaint/Reason for Visit  B LE lymphedema     Onset of Illness/Injury or Date of Surgery 22     Referring Physician Dr. Juarez, DPM     Existing Precautions/Restrictions no known precautions/restrictions     Precautions comments Hx of DM     History of present illness/functional impairment Patient presents with c/o B LE edema that has become more evident since his gastric bypass surgery in 2022.  He has since lost 100 lbs.  He recently saw his podiatrist who recommend consultation with a CLT.  Patient reports his edema has been there for a few years and is isolated to below the knees.  He states that his edema is effecting his work (works for [employer] long shifts) as his legs get hard and painful when he stands for a long time.  Tushar does state that his legs look best first thing in the morning or when he has a day off and can elevate his legs.  He has never used compression socks to date.     Patient/Family/Caregiver Comments/Observations Patient did not receive Farrow wraps (as covered by his insurance) yet.  Reports he worked today and current pain is about a 5/10.     Patient reported fall since last visit No        Pain Assessment    Currently in pain Yes     Preferred Pain Scale  number (Numeric Rating Pain Scale)     Pain Side/Orientation bilateral     Pain: Body location Leg     Pain Rating (0-10): Pre Activity 5     Pain Description constant;throbbing;aching        Pain Intervention    Intervention  See assessment     Post Intervention Comments See assessment                Daily Treatment Assessment and Plan - 09/13/22 1646        Daily Treatment Assessment and Plan    Progress toward goals Progressing     Daily Outcome Summary Initiate MLD to L LE today as compression garment has not yet arrived.  Patient will be getting a Farrow wrap as this was fully covered by his insurance.  Patient tolerated MLD without complaint.  His skin condition continues to be good without blistering, dryness, cracking or any signs of infection.  Trialed Tubigrip size G to L lower leg following manual therapy so that patient may have some interim compression to extremity while waiting on garment.  Patient reports comfort with initial try on.  Instructed to wear as tolerated during the day only and verbalizes Ind.     Plan and Recommendations Follow up with Tubigrip tolerance/efficacy.                     OBJECTIVE DATA TAKEN TODAY:    None taken    Today's Treatment:    ..LYMPHEDEMA PT FLOWSHEET    PT Lymph Exercises Current Session Time   MODALITIES TOTAL TIME FOR SESSION Not performed   Heat (CPT 21557)    Vasocompression/Ice (CPT 00122)    Estim/H-Wave (CPT 13337)    Mechanical Traction (CPT 75202)    THER ACT  CPT 89506 TOTAL TIME FOR SESSION Not performed   Bed Mobility    Transfer Training    Body Mechanics/Work Training    Patient Education Patient education on compression options including pros and cons: compression bandaging versus Circaid reduction kit for treatment phase.  Patient elects to trial reduction kit and start with L LE.   THER EX  CPT 13711 TOTAL TIME FOR SESSION Not performed   CARDIOVASCULAR    Nu Step    UBE    STRENGTHENING     Supine Ther-Ex    Standing Ther-Ex    Seated Ther-Ex     STRETCHING    Core Stabilization    LE Stretching    UE Stretching    Spinal Stretching    Postural     REPEATED MOVEMENTS    NEURO RE-ED  CPT 11947 TOTAL TIME FOR SESSION Not performed   COORDINATION    POSTURAL RE-ED    PRE-GAIT ACTIVITIES    BALANCE TRAINING    Sitting Balance    Standing Balance    KINESIOTAPE    GAIT TRAINING  CPT 42826 TOTAL TIME FOR SESSION Not performed   Ambulation     Dynamic Gait    MANUAL   CPT 76039 TOTAL TIME FOR SESSION 53-67 Minutes   Stretching    Mobilization    Massage- Deep Tissue, Scar, Transverse Friction    Manual Lymph Drainage MLD L LE sequence starting with groin prep and full LE drainage sequence   Skin Care- Lotion/Wrapping    Measurement/Fitting Trialed Tubigrip size G to L lower leg/foot as interim compression post MLD while waiting on garment.     Circaid reduction kit at ; insurance will only cover Farrow wrap so ordered via [facility].   Skin Stretching/Mobilization for Cording      GROUP  CPT 65055 TOTAL TIME FOR SESSION Not performed

## 2022-09-13 NOTE — OP PT TREATMENT LOG
..LYMPHEDEMA PT FLOWSHEET    PT Lymph Exercises Current Session Time   MODALITIES TOTAL TIME FOR SESSION Not performed   Heat (CPT 07808)    Vasocompression/Ice (CPT 09098)    Estim/H-Wave (CPT 59913)    Mechanical Traction (CPT 02734)    THER ACT  CPT 92013 TOTAL TIME FOR SESSION Not performed   Bed Mobility    Transfer Training    Body Mechanics/Work Training    Patient Education Patient education on compression options including pros and cons: compression bandaging versus Circaid reduction kit for treatment phase.  Patient elects to trial reduction kit and start with L LE.   THER EX  CPT 59141 TOTAL TIME FOR SESSION Not performed   CARDIOVASCULAR    Nu Step    UBE    STRENGTHENING     Supine Ther-Ex    Standing Ther-Ex    Seated Ther-Ex    STRETCHING    Core Stabilization    LE Stretching    UE Stretching    Spinal Stretching    Postural     REPEATED MOVEMENTS    NEURO RE-ED  CPT 66330 TOTAL TIME FOR SESSION Not performed   COORDINATION    POSTURAL RE-ED    PRE-GAIT ACTIVITIES    BALANCE TRAINING    Sitting Balance    Standing Balance    KINESIOTAPE    GAIT TRAINING  CPT 22076 TOTAL TIME FOR SESSION Not performed   Ambulation     Dynamic Gait    MANUAL   CPT 39450 TOTAL TIME FOR SESSION 53-67 Minutes   Stretching    Mobilization    Massage- Deep Tissue, Scar, Transverse Friction    Manual Lymph Drainage MLD L LE sequence starting with groin prep and full LE drainage sequence   Skin Care- Lotion/Wrapping    Measurement/Fitting Trialed Tubigrip size G to L lower leg/foot as interim compression post MLD while waiting on garment.     Circaid reduction kit at ; insurance will only cover Farrow wrap so ordered via [facility].   Skin Stretching/Mobilization for Cording      GROUP  CPT 67636 TOTAL TIME FOR SESSION Not performed

## 2022-09-15 ENCOUNTER — HOSPITAL ENCOUNTER (OUTPATIENT)
Dept: PHYSICAL THERAPY | Age: 42
Setting detail: THERAPIES SERIES
Discharge: HOME | End: 2022-09-15
Attending: PODIATRIST
Payer: COMMERCIAL

## 2022-09-15 DIAGNOSIS — I89.0 LYMPHEDEMA: Primary | ICD-10-CM

## 2022-09-15 PROCEDURE — 97530 THERAPEUTIC ACTIVITIES: CPT | Mod: GP,U8

## 2022-09-15 PROCEDURE — 97140 MANUAL THERAPY 1/> REGIONS: CPT | Mod: GP,U8

## 2022-09-15 NOTE — OP PT TREATMENT LOG
..LYMPHEDEMA PT FLOWSHEET    PT Lymph Exercises Current Session Time   MODALITIES TOTAL TIME FOR SESSION Not performed   Heat (CPT 08729)    Vasocompression/Ice (CPT 00589)    Estim/H-Wave (CPT 03335)    Mechanical Traction (CPT 38363)    THER ACT  CPT 15190 TOTAL TIME FOR SESSION 8-22 Minutes   Bed Mobility    Transfer Training    Body Mechanics/Work Training    Patient Education Patient education on Farrow wrap wear schedule starting with 2-3 hours during the day.  Provided with additional cotton stockinette base layers as Farrow stockinette a little snug at the top and at ankle crease.    NT - Patient education on compression options including pros and cons: compression bandaging versus Circaid reduction kit for treatment phase.  Patient elects to trial reduction kit and start with L LE.   THER EX  CPT 72535 TOTAL TIME FOR SESSION Not performed   CARDIOVASCULAR    Nu Step    UBE    STRENGTHENING     Supine Ther-Ex    Standing Ther-Ex    Seated Ther-Ex    STRETCHING    Core Stabilization    LE Stretching    UE Stretching    Spinal Stretching    Postural     REPEATED MOVEMENTS    NEURO RE-ED  CPT 07519 TOTAL TIME FOR SESSION Not performed   COORDINATION    POSTURAL RE-ED    PRE-GAIT ACTIVITIES    BALANCE TRAINING    Sitting Balance    Standing Balance    KINESIOTAPE    GAIT TRAINING  CPT 46945 TOTAL TIME FOR SESSION Not performed   Ambulation     Dynamic Gait    MANUAL   CPT 95161 TOTAL TIME FOR SESSION 38-52 Minutes   Stretching    Mobilization    Massage- Deep Tissue, Scar, Transverse Friction    Manual Lymph Drainage MLD L LE sequence starting with groin prep and full LE drainage sequence   Skin Care- Lotion/Wrapping    Measurement/Fitting Fit assess of Farrow basic leg wrap XL - garment is of good fit to L LE.  Ankle/foot wrap is too big (sent a XL long) and will request exchange.      NT - Trialed Tubigrip size G to L lower leg/foot as interim compression post MLD while waiting on garment.     Circaid  reduction kit at ; insurance will only cover Farrow wrap so ordered via [facility].   Skin Stretching/Mobilization for Cording      GROUP  CPT 28303 TOTAL TIME FOR SESSION Not performed

## 2022-09-15 NOTE — PROGRESS NOTES
PT DAILY NOTE FOR OUTPATIENT THERAPY    Patient: Ramirez Montero MRN: [id number]  : 1980 41 y.o.  Referring Physician: Sanya Juarez Jr., *  Date of Visit: 9/15/2022    Certification Dates: 22 through 10/27/22    Diagnosis:   1. Lymphedema        Chief Complaints:  B LE lymphedema    Precautions:   Precautions comments: Hx of DM  Existing Precautions/Restrictions: no known precautions/restrictions     TODAY'S VISIT    Time In Session:  Start Time: 1600  Stop Time: 1658  Time Calculation (min): 58 min   History/Vitals/Pain/Encounter Info - 09/15/22 1556        Injury History/Precautions/Daily Required Info    Document Type daily treatment     Primary Therapist Jacqui Powell, PT, MSPT, CLT - [facility]     Chief Complaint/Reason for Visit  B LE lymphedema     Onset of Illness/Injury or Date of Surgery 22     Referring Physician Dr. Juarez, DPM     Existing Precautions/Restrictions no known precautions/restrictions     Precautions comments Hx of DM     History of present illness/functional impairment Patient presents with c/o B LE edema that has become more evident since his gastric bypass surgery in 2022.  He has since lost 100 lbs.  He recently saw his podiatrist who recommend consultation with a CLT.  Patient reports his edema has been there for a few years and is isolated to below the knees.  He states that his edema is effecting his work (works for [employer] long shifts) as his legs get hard and painful when he stands for a long time.  Tushar does state that his legs look best first thing in the morning or when he has a day off and can elevate his legs.  He has never used compression socks to date.     Patient/Family/Caregiver Comments/Observations Patient presents today with his Farrow wraps.     Patient reported fall since last visit No        Pain Assessment    Currently in pain Yes     Preferred Pain Scale number (Numeric Rating Pain Scale)     Pain Side/Orientation bilateral     Pain: Body  "location Leg     Pain Rating (0-10): Pre Activity 8     Pain Description constant;throbbing;aching        Pain Intervention    Intervention  See log     Post Intervention Comments See assessment                Daily Treatment Assessment and Plan - 09/15/22 1658        Daily Treatment Assessment and Plan    Progress toward goals Progressing     Daily Outcome Summary Patient with good tolerance following initial MLD session and use of Tubigrip during the day to date.  L LE tissue is soft and mobile today and patient notes \"feels lighter.\"  Fit assessed for Farrow basic leg piece size XL - garment is of good fit and patient demonstrates ability to don / doff although recommend assistance from spouse.  Per flow sheet foot/ankle wrap is too big so will ask for size exchange via [facility].     Plan and Recommendations Follow up with [employer] use.                     OBJECTIVE DATA TAKEN TODAY:    None taken    Today's Treatment:    ..LYMPHEDEMA PT FLOWSHEET    PT Lymph Exercises Current Session Time   MODALITIES TOTAL TIME FOR SESSION Not performed   Heat (CPT 87085)    Vasocompression/Ice (CPT 72621)    Estim/H-Wave (CPT 35412)    Mechanical Traction (CPT 14838)    THER ACT  CPT 98265 TOTAL TIME FOR SESSION 8-22 Minutes   Bed Mobility    Transfer Training    Body Mechanics/Work Training    Patient Education Patient education on Farrow wrap wear schedule starting with 2-3 hours during the day.  Provided with additional cotton stockinette base layers as Farrow stockinette a little snug at the top and at ankle crease.    NT - Patient education on compression options including pros and cons: compression bandaging versus Circaid reduction kit for treatment phase.  Patient elects to trial reduction kit and start with L LE.   THER EX  CPT 12543 TOTAL TIME FOR SESSION Not performed   CARDIOVASCULAR    Nu Step    UBE    STRENGTHENING     Supine Ther-Ex    Standing Ther-Ex    Seated Ther-Ex    STRETCHING    Core Stabilization    LE " Stretching    UE Stretching    Spinal Stretching    Postural     REPEATED MOVEMENTS    NEURO RE-ED  CPT 57240 TOTAL TIME FOR SESSION Not performed   COORDINATION    POSTURAL RE-ED    PRE-GAIT ACTIVITIES    BALANCE TRAINING    Sitting Balance    Standing Balance    KINESIOTAPE    GAIT TRAINING  CPT 83567 TOTAL TIME FOR SESSION Not performed   Ambulation     Dynamic Gait    MANUAL   CPT 73528 TOTAL TIME FOR SESSION 38-52 Minutes   Stretching    Mobilization    Massage- Deep Tissue, Scar, Transverse Friction    Manual Lymph Drainage MLD L LE sequence starting with groin prep and full LE drainage sequence   Skin Care- Lotion/Wrapping    Measurement/Fitting Fit assess of Farrow basic leg wrap XL - garment is of good fit to L LE.  Ankle/foot wrap is too big (sent a XL long) and will request exchange.      NT - Trialed Tubigrip size G to L lower leg/foot as interim compression post MLD while waiting on garment.     Circaid reduction kit at ; insurance will only cover Farrow wrap so ordered via [facility].   Skin Stretching/Mobilization for Cording      GROUP  CPT 09012 TOTAL TIME FOR SESSION Not performed

## 2022-09-20 ENCOUNTER — HOSPITAL ENCOUNTER (OUTPATIENT)
Dept: PHYSICAL THERAPY | Age: 42
Setting detail: THERAPIES SERIES
Discharge: HOME | End: 2022-09-20
Attending: PODIATRIST
Payer: COMMERCIAL

## 2022-09-20 DIAGNOSIS — I89.0 LYMPHEDEMA: Primary | ICD-10-CM

## 2022-09-20 PROCEDURE — 97110 THERAPEUTIC EXERCISES: CPT | Mod: GP,U8

## 2022-09-20 PROCEDURE — 97140 MANUAL THERAPY 1/> REGIONS: CPT | Mod: GP,U8

## 2022-09-20 NOTE — PROGRESS NOTES
PT DAILY NOTE FOR OUTPATIENT THERAPY    Patient: Ramirez Montero MRN: [id number]  : 1980 41 y.o.  Referring Physician: Sanya Juarez Jr., *  Date of Visit: 2022    Certification Dates: 22 through 10/27/22    Diagnosis:   1. Lymphedema        Chief Complaints:  B LE lymphedema    Precautions:   Precautions comments: Hx of DM  Existing Precautions/Restrictions: no known precautions/restrictions     TODAY'S VISIT    Time In Session:  Start Time: 859  Stop Time: 953  Time Calculation (min): 54 min   History/Vitals/Pain/Encounter Info - 22 0904        Injury History/Precautions/Daily Required Info    Document Type daily treatment     Primary Therapist Jacqui Powell, PT, MSPT, CLT - [facility]     Chief Complaint/Reason for Visit  B LE lymphedema     Onset of Illness/Injury or Date of Surgery 22     Referring Physician Dr. Juarez, DPM     Existing Precautions/Restrictions no known precautions/restrictions     Precautions comments Hx of DM     History of present illness/functional impairment Patient presents with c/o B LE edema that has become more evident since his gastric bypass surgery in 2022.  He has since lost 100 lbs.  He recently saw his podiatrist who recommend consultation with a CLT.  Patient reports his edema has been there for a few years and is isolated to below the knees.  He states that his edema is effecting his work (works for [employer] long shifts) as his legs get hard and painful when he stands for a long time.  Tushar does state that his legs look best first thing in the morning or when he has a day off and can elevate his legs.  He has never used compression socks to date.     Patient/Family/Caregiver Comments/Observations Patient wore his wraps up to 8 hours/day over the weekend.  Denies issues or concerns. Presents this morning with garment donned.     Patient reported fall since last visit No        Pain Assessment    Currently in pain Yes     Preferred Pain Scale number  (Numeric Rating Pain Scale)     Pain Side/Orientation bilateral     Pain: Body location Leg     Pain Rating (0-10): Pre Activity 4        Pain Intervention    Intervention  See log     Post Intervention Comments See assessment                Daily Treatment Assessment and Plan - 09/20/22 0904        Daily Treatment Assessment and Plan    Progress toward goals Progressing     Daily Outcome Summary Patient demonstrates significant L LE volume reduction and improved tissue texture with use of Farrow wrap x 5 days.  He has been compliant and without issue with daytime use of compression to date.  Continued with MLD and skin care with addition of Nustep today end of session.  Patient to access gym in his building when able to utilize recumbent bike for 10 min + per day and working up to 20-30 minutes with compression donned.     Plan and Recommendations Continue with manual, compression use; discuss options for maintenance compression.                     OBJECTIVE DATA TAKEN TODAY:    Lymphedema     Lymphedema Assessment - 09/20/22 0900        Other Affected LE Circumference    Unaffected limb laterality Left     MTP 26.2 cm     -8 cm 29.4 cm     0 cm 35 cm     4 cm 50.3 cm     8 cm 58 cm     12 cm 65 cm     16 cm 67.5 cm     20 cm 68.7 cm     24 cm 70 cm     28 cm 72.7 cm     32 cm 63 cm     Other Affected LE Total Volume (ml) 586520.7 ml                 Today's Treatment:    ..LYMPHEDEMA PT FLOWSHEET    PT Lymph Exercises Current Session Time   MODALITIES TOTAL TIME FOR SESSION Not performed   Heat (CPT 48762)    Vasocompression/Ice (CPT 18959)    Estim/H-Wave (CPT 73175)    Mechanical Traction (CPT 36084)    THER ACT  CPT 61499 TOTAL TIME FOR SESSION 0-7 Minutes   Bed Mobility    Transfer Training    Body Mechanics/Work Training    Patient Education Patient edu to initiate Farrow use at night in addition to daytime use as tolerated.    NT - Patient education on Farrow wrap wear schedule starting with 2-3 hours during  the day.  Provided with additional cotton stockinette base layers as Farrow stockinette a little snug at the top and at ankle crease.    NT - Patient education on compression options including pros and cons: compression bandaging versus Circaid reduction kit for treatment phase.  Patient elects to trial reduction kit and start with L LE.   THER EX  CPT 41359 TOTAL TIME FOR SESSION 8-22 Minutes   CARDIOVASCULAR    Nu Step L3 x 10 minutes following manual therapy with compression donned.   UBE    STRENGTHENING     Supine Ther-Ex    Standing Ther-Ex    Seated Ther-Ex    STRETCHING    Core Stabilization    LE Stretching    UE Stretching    Spinal Stretching    Postural     REPEATED MOVEMENTS    NEURO RE-ED  CPT 41442 TOTAL TIME FOR SESSION Not performed   COORDINATION    POSTURAL RE-ED    PRE-GAIT ACTIVITIES    BALANCE TRAINING    Sitting Balance    Standing Balance    KINESIOTAPE    GAIT TRAINING  CPT 26112 TOTAL TIME FOR SESSION Not performed   Ambulation     Dynamic Gait    MANUAL   CPT 47885 TOTAL TIME FOR SESSION 38-52 Minutes   Stretching    Mobilization    Massage- Deep Tissue, Scar, Transverse Friction    Manual Lymph Drainage MLD L LE sequence starting with groin prep and full L LE drainage sequence   Skin Care- Lotion/Wrapping    Measurement/Fitting Volumetrics of L LE taken.    NT - Fit assess of Farrow basic leg wrap XL - garment is of good fit to L LE.  Ankle/foot wrap is too big (sent a XL long) and will request exchange.      NT - Trialed Tubigrip size G to L lower leg/foot as interim compression post MLD while waiting on garment.     Circaid reduction kit at ; insurance will only cover Farrow wrap so ordered via [facility].   Skin Stretching/Mobilization for Cording      GROUP  CPT 23422 TOTAL TIME FOR SESSION Not performed

## 2022-09-20 NOTE — OP PT TREATMENT LOG
..LYMPHEDEMA PT FLOWSHEET    PT Lymph Exercises Current Session Time   MODALITIES TOTAL TIME FOR SESSION Not performed   Heat (CPT 85273)    Vasocompression/Ice (CPT 58658)    Estim/H-Wave (CPT 40851)    Mechanical Traction (CPT 61210)    THER ACT  CPT 12049 TOTAL TIME FOR SESSION 0-7 Minutes   Bed Mobility    Transfer Training    Body Mechanics/Work Training    Patient Education Patient edu to initiate Farrow use at night in addition to daytime use as tolerated.    NT - Patient education on Farrow wrap wear schedule starting with 2-3 hours during the day.  Provided with additional cotton stockinette base layers as Farrow stockinette a little snug at the top and at ankle crease.    NT - Patient education on compression options including pros and cons: compression bandaging versus Circaid reduction kit for treatment phase.  Patient elects to trial reduction kit and start with L LE.   THER EX  CPT 38067 TOTAL TIME FOR SESSION 8-22 Minutes   CARDIOVASCULAR    Nu Step L3 x 10 minutes following manual therapy with compression donned.   UBE    STRENGTHENING     Supine Ther-Ex    Standing Ther-Ex    Seated Ther-Ex    STRETCHING    Core Stabilization    LE Stretching    UE Stretching    Spinal Stretching    Postural     REPEATED MOVEMENTS    NEURO RE-ED  CPT 97151 TOTAL TIME FOR SESSION Not performed   COORDINATION    POSTURAL RE-ED    PRE-GAIT ACTIVITIES    BALANCE TRAINING    Sitting Balance    Standing Balance    KINESIOTAPE    GAIT TRAINING  CPT 58938 TOTAL TIME FOR SESSION Not performed   Ambulation     Dynamic Gait    MANUAL   CPT 80545 TOTAL TIME FOR SESSION 38-52 Minutes   Stretching    Mobilization    Massage- Deep Tissue, Scar, Transverse Friction    Manual Lymph Drainage MLD L LE sequence starting with groin prep and full L LE drainage sequence   Skin Care- Lotion/Wrapping    Measurement/Fitting Volumetrics of L LE taken.    NT - Fit assess of Farrow basic leg wrap XL - garment is of good fit to L LE.  Ankle/foot  wrap is too big (sent a XL long) and will request exchange.      NT - Trialed Tubigrip size G to L lower leg/foot as interim compression post MLD while waiting on garment.     Circaid reduction kit at ; insurance will only cover Farrow wrap so ordered via [facility].   Skin Stretching/Mobilization for Cording      GROUP  CPT 31326 TOTAL TIME FOR SESSION Not performed

## 2022-09-22 ENCOUNTER — HOSPITAL ENCOUNTER (OUTPATIENT)
Dept: PHYSICAL THERAPY | Age: 42
Setting detail: THERAPIES SERIES
Discharge: HOME | End: 2022-09-22
Attending: PODIATRIST
Payer: COMMERCIAL

## 2022-09-22 DIAGNOSIS — I89.0 LYMPHEDEMA: Primary | ICD-10-CM

## 2022-09-22 PROCEDURE — 97140 MANUAL THERAPY 1/> REGIONS: CPT | Mod: GP,U8

## 2022-09-22 NOTE — OP PT TREATMENT LOG
..LYMPHEDEMA PT FLOWSHEET    PT Lymph Exercises Current Session Time   MODALITIES TOTAL TIME FOR SESSION Not performed   Heat (CPT 27372)    Vasocompression/Ice (CPT 68670)    Estim/H-Wave (CPT 76724)    Mechanical Traction (CPT 00930)    THER ACT  CPT 31270 TOTAL TIME FOR SESSION Not performed   Bed Mobility    Transfer Training    Body Mechanics/Work Training    Patient Education Patient edu to initiate Farrow use at night in addition to daytime use as tolerated.    NT - Patient education on Farrow wrap wear schedule starting with 2-3 hours during the day.  Provided with additional cotton stockinette base layers as Farrow stockinette a little snug at the top and at ankle crease.    NT - Patient education on compression options including pros and cons: compression bandaging versus Circaid reduction kit for treatment phase.  Patient elects to trial reduction kit and start with L LE.   THER EX  CPT 26927 TOTAL TIME FOR SESSION Not performed   CARDIOVASCULAR    Nu Step L3 x 10 minutes following manual therapy with compression donned.   UBE    STRENGTHENING     Supine Ther-Ex    Standing Ther-Ex    Seated Ther-Ex    STRETCHING    Core Stabilization    LE Stretching    UE Stretching    Spinal Stretching    Postural     REPEATED MOVEMENTS    NEURO RE-ED  CPT 17945 TOTAL TIME FOR SESSION Not performed   COORDINATION    POSTURAL RE-ED    PRE-GAIT ACTIVITIES    BALANCE TRAINING    Sitting Balance    Standing Balance    KINESIOTAPE    GAIT TRAINING  CPT 69525 TOTAL TIME FOR SESSION Not performed   Ambulation     Dynamic Gait    MANUAL   CPT 66239 TOTAL TIME FOR SESSION 53-67 Minutes   Stretching    Mobilization    Massage- Deep Tissue, Scar, Transverse Friction    Manual Lymph Drainage MLD L LE sequence starting with groin prep and full L LE drainage sequence; added Komprex foam cut out to medial aspect of L ankle under Farrow wrap but on top of liner.   Skin Care- Lotion/Wrapping    Measurement/Fitting NT - Volumetrics of L  LE taken.    NT - Fit assess of Farrow basic leg wrap XL - garment is of good fit to L LE.  Ankle/foot wrap is too big (sent a XL long) and will request exchange.      NT - Trialed Tubigrip size G to L lower leg/foot as interim compression post MLD while waiting on garment.     Circaid reduction kit at ; insurance will only cover Farrow wrap so ordered via [facility].   Skin Stretching/Mobilization for Cording      GROUP  CPT 11035 TOTAL TIME FOR SESSION Not performed

## 2022-09-22 NOTE — PROGRESS NOTES
"PT DAILY NOTE FOR OUTPATIENT THERAPY    Patient: Ramirez Montero MRN: [id number]  : 1980 41 y.o.  Referring Physician: Sanya Juarez Jr., *  Date of Visit: 2022    Certification Dates: 22 through 10/27/22    Diagnosis:   1. Lymphedema        Chief Complaints:  B LE lymphedema    Precautions:   Precautions comments: Hx of DM  Existing Precautions/Restrictions: no known precautions/restrictions     TODAY'S VISIT    Time In Session:  Start Time: 1501  Stop Time: 1557  Time Calculation (min): 56 min   History/Vitals/Pain/Encounter Info - 22 1500        Injury History/Precautions/Daily Required Info    Document Type daily treatment     Primary Therapist Jacqui Powell, PT, MSPT, CLT - [facility]     Chief Complaint/Reason for Visit  B LE lymphedema     Onset of Illness/Injury or Date of Surgery 22     Referring Physician Dr. Juarez, DPM     Existing Precautions/Restrictions no known precautions/restrictions     Precautions comments Hx of DM     History of present illness/functional impairment Patient presents with c/o B LE edema that has become more evident since his gastric bypass surgery in 2022.  He has since lost 100 lbs.  He recently saw his podiatrist who recommend consultation with a CLT.  Patient reports his edema has been there for a few years and is isolated to below the knees.  He states that his edema is effecting his work (works for [employer] long shifts) as his legs get hard and painful when he stands for a long time.  Tushar does state that his legs look best first thing in the morning or when he has a day off and can elevate his legs.  He has never used compression socks to date.     Patient/Family/Caregiver Comments/Observations Patient denies issues with L LE garment since last visit.  \"I'm doing good!\"     Patient reported fall since last visit No        Pain Assessment    Currently in pain Yes     Preferred Pain Scale number (Numeric Rating Pain Scale)     Pain " Side/Orientation bilateral     Pain: Body location Leg     Pain Rating (0-10): Pre Activity 6     Pain Description constant;sore        Pain Intervention    Intervention  MLD, Compression     Post Intervention Comments See assessment                Daily Treatment Assessment and Plan - 09/22/22 5125        Daily Treatment Assessment and Plan    Progress toward goals Progressing     Daily Outcome Summary Patient continues to have improved tissue texture and softening of L lower leg and ankle with consistent compression use.  Added medial Komprex foam cut out to L ankle today end of session to improve tissue contour in that region.  Patient is Ind with compression use to date.     Plan and Recommendations Continue with manual, compression and volume monitoring.                     OBJECTIVE DATA TAKEN TODAY:    None taken    Today's Treatment:    ..LYMPHEDEMA PT FLOWSHEET    PT Lymph Exercises Current Session Time   MODALITIES TOTAL TIME FOR SESSION Not performed   Heat (CPT 20531)    Vasocompression/Ice (CPT 79459)    Estim/H-Wave (CPT 55679)    Mechanical Traction (CPT 07971)    THER ACT  CPT 91092 TOTAL TIME FOR SESSION Not performed   Bed Mobility    Transfer Training    Body Mechanics/Work Training    Patient Education Patient edu to initiate Farrow use at night in addition to daytime use as tolerated.    NT - Patient education on Farrow wrap wear schedule starting with 2-3 hours during the day.  Provided with additional cotton stockinette base layers as Farrow stockinette a little snug at the top and at ankle crease.    NT - Patient education on compression options including pros and cons: compression bandaging versus Circaid reduction kit for treatment phase.  Patient elects to trial reduction kit and start with L LE.   THER EX  CPT 39445 TOTAL TIME FOR SESSION Not performed   CARDIOVASCULAR    Nu Step L3 x 10 minutes following manual therapy with compression donned.   UBE    STRENGTHENING     Supine Ther-Ex     Standing Ther-Ex    Seated Ther-Ex    STRETCHING    Core Stabilization    LE Stretching    UE Stretching    Spinal Stretching    Postural     REPEATED MOVEMENTS    NEURO RE-ED  CPT 53107 TOTAL TIME FOR SESSION Not performed   COORDINATION    POSTURAL RE-ED    PRE-GAIT ACTIVITIES    BALANCE TRAINING    Sitting Balance    Standing Balance    KINESIOTAPE    GAIT TRAINING  CPT 36408 TOTAL TIME FOR SESSION Not performed   Ambulation     Dynamic Gait    MANUAL   CPT 03292 TOTAL TIME FOR SESSION 53-67 Minutes   Stretching    Mobilization    Massage- Deep Tissue, Scar, Transverse Friction    Manual Lymph Drainage MLD L LE sequence starting with groin prep and full L LE drainage sequence; added Komprex foam cut out to medial aspect of L ankle under Farrow wrap but on top of liner.   Skin Care- Lotion/Wrapping    Measurement/Fitting NT - Volumetrics of L LE taken.    NT - Fit assess of Farrow basic leg wrap XL - garment is of good fit to L LE.  Ankle/foot wrap is too big (sent a XL long) and will request exchange.      NT - Trialed Tubigrip size G to L lower leg/foot as interim compression post MLD while waiting on garment.     Circaid reduction kit at ; insurance will only cover Farrow wrap so ordered via [facility].   Skin Stretching/Mobilization for Cording      GROUP  CPT 02670 TOTAL TIME FOR SESSION Not performed

## 2022-09-27 ENCOUNTER — HOSPITAL ENCOUNTER (OUTPATIENT)
Dept: PHYSICAL THERAPY | Age: 42
Setting detail: THERAPIES SERIES
Discharge: HOME | End: 2022-09-27
Attending: PODIATRIST
Payer: COMMERCIAL

## 2022-09-27 DIAGNOSIS — I89.0 LYMPHEDEMA: Primary | ICD-10-CM

## 2022-09-27 PROCEDURE — 97140 MANUAL THERAPY 1/> REGIONS: CPT | Mod: GP,U8

## 2022-09-27 NOTE — PROGRESS NOTES
PT DAILY NOTE FOR OUTPATIENT THERAPY    Patient: Ramirez Montero MRN: [id number]  : 1980 41 y.o.  Referring Physician: Sanya Juarez Jr., *  Date of Visit: 2022    Certification Dates: 22 through 10/27/22    Diagnosis:   1. Lymphedema        Chief Complaints:  B LE lymphedema    Precautions:   Precautions comments: Hx of DM  Existing Precautions/Restrictions: no known precautions/restrictions     TODAY'S VISIT    Time In Session:  Start Time: 0900  Stop Time: 09  Time Calculation (min): 56 min   History/Vitals/Pain/Encounter Info - 22 0911        Injury History/Precautions/Daily Required Info    Document Type daily treatment     Primary Therapist Jacqui Powell, PT, MSPT, CLT - [facility]     Chief Complaint/Reason for Visit  B LE lymphedema     Onset of Illness/Injury or Date of Surgery 22     Referring Physician Dr. Juarez, DPM     Existing Precautions/Restrictions no known precautions/restrictions     Precautions comments Hx of DM     History of present illness/functional impairment Patient presents with c/o B LE edema that has become more evident since his gastric bypass surgery in 2022.  He has since lost 100 lbs.  He recently saw his podiatrist who recommend consultation with a CLT.  Patient reports his edema has been there for a few years and is isolated to below the knees.  He states that his edema is effecting his work (works for [employer] long shifts) as his legs get hard and painful when he stands for a long time.  Tushar does state that his legs look best first thing in the morning or when he has a day off and can elevate his legs.  He has never used compression socks to date.     Patient/Family/Caregiver Comments/Observations Patient presents with L LE garment donned. Denies issues and has been wearing regularly.  Replacement foot/ankle wrap was delivered this morning.     Patient reported fall since last visit No        Pain Assessment    Currently in pain No/Denies     Pain  Side/Orientation bilateral     Pain: Body location Leg     Pain Rating (0-10): Pre Activity 0        Pain Intervention    Intervention  MLD, compression     Post Intervention Comments See assessment                Daily Treatment Assessment and Plan - 09/27/22 1057        Daily Treatment Assessment and Plan    Progress toward goals Progressing     Daily Outcome Summary Patient demonstrates additional L lower extremity volume reduction since last week with consistent daytime compression use.  Komprex foam insert has made improvements in limb contour at medial aspect of lower leg/ankle.  Patient received new foot/ankle wrap and it is of better fit when donned prior to legging. Patient to try it on at home with appropriate footwear to insure safety with mobility.  [facility] is processing pre auth for flat knit garments and R LE Farrow wrap.     Plan and Recommendations Continue with POC.                     OBJECTIVE DATA TAKEN TODAY:    Lymphedema     Lymphedema Assessment - 09/27/22 0900        Other Affected LE Circumference    Unaffected limb laterality Left     MTP 25.9 cm     -8 cm 31.5 cm     0 cm 34.3 cm     4 cm 47 cm     8 cm 55 cm     12 cm 62 cm     16 cm 66 cm     20 cm 68 cm     24 cm 71.2 cm     28 cm 71.8 cm     32 cm 64.7 cm     Other Affected LE Total Volume (ml) 590595.58 ml                 Today's Treatment:    ..LYMPHEDEMA PT FLOWSHEET    PT Lymph Exercises Current Session Time   MODALITIES TOTAL TIME FOR SESSION Not performed   Heat (CPT 88039)    Vasocompression/Ice (CPT 06555)    Estim/H-Wave (CPT 64072)    Mechanical Traction (CPT 99142)    THER ACT  CPT 06039 TOTAL TIME FOR SESSION Not performed   Bed Mobility    Transfer Training    Body Mechanics/Work Training    Patient Education Patient edu to initiate Farrow use at night in addition to daytime use as tolerated.    NT - Patient education on Farrow wrap wear schedule starting with 2-3 hours during the day.  Provided with additional cotton  stockinette base layers as Farrow stockinette a little snug at the top and at ankle crease.    NT - Patient education on compression options including pros and cons: compression bandaging versus Circaid reduction kit for treatment phase.  Patient elects to trial reduction kit and start with L LE.   THER EX  CPT 96918 TOTAL TIME FOR SESSION Not performed   CARDIOVASCULAR    Nu Step L3 x 10 minutes following manual therapy with compression donned.   UBE    STRENGTHENING     Supine Ther-Ex    Standing Ther-Ex    Seated Ther-Ex    STRETCHING    Core Stabilization    LE Stretching    UE Stretching    Spinal Stretching    Postural     REPEATED MOVEMENTS    NEURO RE-ED  CPT 89574 TOTAL TIME FOR SESSION Not performed   COORDINATION    POSTURAL RE-ED    PRE-GAIT ACTIVITIES    BALANCE TRAINING    Sitting Balance    Standing Balance    KINESIOTAPE    GAIT TRAINING  CPT 35995 TOTAL TIME FOR SESSION Not performed   Ambulation     Dynamic Gait    MANUAL   CPT 60502 TOTAL TIME FOR SESSION 53-67 Minutes   Stretching    Mobilization    Massage- Deep Tissue, Scar, Transverse Friction    Manual Lymph Drainage MLD L LE sequence starting with groin prep and full L LE drainage sequence; added Komprex foam cut out to medial aspect of L ankle under Farrow wrap but on top of liner.   Skin Care- Lotion/Wrapping    Measurement/Fitting Volumetrics of L LE taken.    Fit assessment of new Farrow foot/ankle wrap size L regular.    NT - Fit assess of Farrow basic leg wrap XL - garment is of good fit to L LE.  Ankle/foot wrap is too big (sent a XL long) and will request exchange.      NT - Trialed Tubigrip size G to L lower leg/foot as interim compression post MLD while waiting on garment.     Circaid reduction kit at ; insurance will only cover Farrow wrap so ordered via [facility].   Skin Stretching/Mobilization for Cording      GROUP  CPT 81342 TOTAL TIME FOR SESSION Not performed

## 2022-09-27 NOTE — OP PT TREATMENT LOG
..LYMPHEDEMA PT FLOWSHEET    PT Lymph Exercises Current Session Time   MODALITIES TOTAL TIME FOR SESSION Not performed   Heat (CPT 15559)    Vasocompression/Ice (CPT 51110)    Estim/H-Wave (CPT 34042)    Mechanical Traction (CPT 58971)    THER ACT  CPT 36370 TOTAL TIME FOR SESSION Not performed   Bed Mobility    Transfer Training    Body Mechanics/Work Training    Patient Education Patient edu to initiate Farrow use at night in addition to daytime use as tolerated.    NT - Patient education on Farrow wrap wear schedule starting with 2-3 hours during the day.  Provided with additional cotton stockinette base layers as Farrow stockinette a little snug at the top and at ankle crease.    NT - Patient education on compression options including pros and cons: compression bandaging versus Circaid reduction kit for treatment phase.  Patient elects to trial reduction kit and start with L LE.   THER EX  CPT 64825 TOTAL TIME FOR SESSION Not performed   CARDIOVASCULAR    Nu Step L3 x 10 minutes following manual therapy with compression donned.   UBE    STRENGTHENING     Supine Ther-Ex    Standing Ther-Ex    Seated Ther-Ex    STRETCHING    Core Stabilization    LE Stretching    UE Stretching    Spinal Stretching    Postural     REPEATED MOVEMENTS    NEURO RE-ED  CPT 65946 TOTAL TIME FOR SESSION Not performed   COORDINATION    POSTURAL RE-ED    PRE-GAIT ACTIVITIES    BALANCE TRAINING    Sitting Balance    Standing Balance    KINESIOTAPE    GAIT TRAINING  CPT 82565 TOTAL TIME FOR SESSION Not performed   Ambulation     Dynamic Gait    MANUAL   CPT 20955 TOTAL TIME FOR SESSION 53-67 Minutes   Stretching    Mobilization    Massage- Deep Tissue, Scar, Transverse Friction    Manual Lymph Drainage MLD L LE sequence starting with groin prep and full L LE drainage sequence; added Komprex foam cut out to medial aspect of L ankle under Farrow wrap but on top of liner.   Skin Care- Lotion/Wrapping    Measurement/Fitting Volumetrics of L LE  taken.    Fit assessment of new Farrow foot/ankle wrap size L regular.    NT - Fit assess of Farrow basic leg wrap XL - garment is of good fit to L LE.  Ankle/foot wrap is too big (sent a XL long) and will request exchange.      NT - Trialed Tubigrip size G to L lower leg/foot as interim compression post MLD while waiting on garment.     Circaid reduction kit at ; insurance will only cover Farrow wrap so ordered via [facility].   Skin Stretching/Mobilization for Cording      GROUP  CPT 96164 TOTAL TIME FOR SESSION Not performed

## 2022-09-29 ENCOUNTER — HOSPITAL ENCOUNTER (OUTPATIENT)
Dept: PHYSICAL THERAPY | Age: 42
Setting detail: THERAPIES SERIES
Discharge: HOME | End: 2022-09-29
Attending: PODIATRIST
Payer: COMMERCIAL

## 2022-09-29 DIAGNOSIS — I89.0 LYMPHEDEMA: Primary | ICD-10-CM

## 2022-09-29 PROCEDURE — 97140 MANUAL THERAPY 1/> REGIONS: CPT | Mod: GP,U8

## 2022-09-29 PROCEDURE — 97110 THERAPEUTIC EXERCISES: CPT | Mod: GP,U8

## 2022-09-29 NOTE — OP PT TREATMENT LOG
..LYMPHEDEMA PT FLOWSHEET    PT Lymph Exercises Current Session Time   MODALITIES TOTAL TIME FOR SESSION Not performed   Heat (CPT 67046)    Vasocompression/Ice (CPT 11230)3    Estim/H-Wave (CPT 91560)    Mechanical Traction (CPT 19622)    THER ACT  CPT 85949 TOTAL TIME FOR SESSION Not performed   Bed Mobility    Transfer Training    Body Mechanics/Work Training    Patient Education Patient edu to initiate Farrow use at night in addition to daytime use as tolerated.    NT - Patient education on Farrow wrap wear schedule starting with 2-3 hours during the day.  Provided with additional cotton stockinette base layers as Farrow stockinette a little snug at the top and at ankle crease.    NT - Patient education on compression options including pros and cons: compression bandaging versus Circaid reduction kit for treatment phase.  Patient elects to trial reduction kit and start with L LE.   THER EX  CPT 33887 TOTAL TIME FOR SESSION 8-22 Minutes   CARDIOVASCULAR    Nu Step L3 x 10 minutes following manual therapy with compression donned.   UBE    STRENGTHENING     Supine Ther-Ex    Standing Ther-Ex    Seated Ther-Ex    STRETCHING    Core Stabilization    LE Stretching    UE Stretching    Spinal Stretching    Postural     REPEATED MOVEMENTS    NEURO RE-ED  CPT 66816 TOTAL TIME FOR SESSION Not performed   COORDINATION    POSTURAL RE-ED    PRE-GAIT ACTIVITIES    BALANCE TRAINING    Sitting Balance    Standing Balance    KINESIOTAPE    GAIT TRAINING  CPT 35485 TOTAL TIME FOR SESSION Not performed   Ambulation     Dynamic Gait    MANUAL   CPT 18708 TOTAL TIME FOR SESSION 38-52 Minutes   Stretching    Mobilization    Massage- Deep Tissue, Scar, Transverse Friction    Manual Lymph Drainage MLD L LE sequence starting with groin prep and full L LE drainage sequence; added Komprex foam cut out to medial aspect of L ankle under Farrow wrap but on top of liner.   Skin Care- Lotion/Wrapping    Measurement/Fitting NT - Volumetrics of L  LE taken.  NT - Fit assessment of new Farrow foot/ankle wrap size L regular.    NT - Fit assess of Farrow basic leg wrap XL - garment is of good fit to L LE.  Ankle/foot wrap is too big (sent a XL long) and will request exchange.      NT - Trialed Tubigrip size G to L lower leg/foot as interim compression post MLD while waiting on garment.     Circaid reduction kit at ; insurance will only cover Farrow wrap so ordered via [facility].   Skin Stretching/Mobilization for Cording      GROUP  CPT 65946 TOTAL TIME FOR SESSION Not performed

## 2022-09-29 NOTE — PROGRESS NOTES
PT DAILY NOTE FOR OUTPATIENT THERAPY    Patient: Ramirez Montero MRN: [id number]  : 1980 41 y.o.  Referring Physician: Sanya Juarez Jr., *  Date of Visit: 2022    Certification Dates: 22 through 10/27/22    Diagnosis:   1. Lymphedema        Chief Complaints:  B LE lymphedema    Precautions:   Precautions comments: Hx of DM  Existing Precautions/Restrictions: no known precautions/restrictions     TODAY'S VISIT    Time In Session:  Start Time: 1500  Stop Time: 1557  Time Calculation (min): 57 min   History/Vitals/Pain/Encounter Info - 22 1456        Injury History/Precautions/Daily Required Info    Document Type daily treatment     Primary Therapist Jacqui Powell, PT, MSPT, CLT - [facility]     Chief Complaint/Reason for Visit  B LE lymphedema     Onset of Illness/Injury or Date of Surgery 22     Referring Physician Dr. Juarez, DPM     Existing Precautions/Restrictions no known precautions/restrictions     Precautions comments Hx of DM     History of present illness/functional impairment Patient presents with c/o B LE edema that has become more evident since his gastric bypass surgery in 2022.  He has since lost 100 lbs.  He recently saw his podiatrist who recommend consultation with a CLT.  Patient reports his edema has been there for a few years and is isolated to below the knees.  He states that his edema is effecting his work (works for [employer] long shifts) as his legs get hard and painful when he stands for a long time.  Tushar does state that his legs look best first thing in the morning or when he has a day off and can elevate his legs.  He has never used compression socks to date.     Patient/Family/Caregiver Comments/Observations Patient reports his leg has been very sore since yesterday afternoon for NKR.  He continues to wear the Farrow without pain, but when he removes it he feels like his leg is tender.  No skin changes.     Patient reported fall since last visit No         Pain Assessment    Currently in pain Yes     Preferred Pain Scale number (Numeric Rating Pain Scale)     Pain Side/Orientation bilateral     Pain: Body location Leg     Pain Rating (0-10): Pre Activity 4        Pain Intervention    Intervention  MLD, COmpression     Post Intervention Comments See assessment                Daily Treatment Assessment and Plan - 09/29/22 5162        Daily Treatment Assessment and Plan    Progress toward goals Progressing     Daily Outcome Summary Patient continues to progress with CDT to L LE.  Waiting on Farrow wrap for R LE that has been ordered via [facility].  Patient unable to get on bike this week so resumed Nustep today for added CV exercise promoting lymphatic circulation.     Plan and Recommendations Continue with POC.  Initiate R LE treatment when Farrow arrives.                     OBJECTIVE DATA TAKEN TODAY:    None taken    Today's Treatment:    ..LYMPHEDEMA PT FLOWSHEET    PT Lymph Exercises Current Session Time   MODALITIES TOTAL TIME FOR SESSION Not performed   Heat (CPT 86975)    Vasocompression/Ice (CPT 08505)3    Estim/H-Wave (CPT 62925)    Mechanical Traction (CPT 59583)    THER ACT  CPT 62638 TOTAL TIME FOR SESSION Not performed   Bed Mobility    Transfer Training    Body Mechanics/Work Training    Patient Education Patient edu to initiate Farrow use at night in addition to daytime use as tolerated.    NT - Patient education on Farrow wrap wear schedule starting with 2-3 hours during the day.  Provided with additional cotton stockinette base layers as Farrow stockinette a little snug at the top and at ankle crease.    NT - Patient education on compression options including pros and cons: compression bandaging versus Circaid reduction kit for treatment phase.  Patient elects to trial reduction kit and start with L LE.   THER EX  CPT 92267 TOTAL TIME FOR SESSION 8-22 Minutes   CARDIOVASCULAR    Nu Step L3 x 10 minutes following manual therapy with compression donned.   UBE     STRENGTHENING     Supine Ther-Ex    Standing Ther-Ex    Seated Ther-Ex    STRETCHING    Core Stabilization    LE Stretching    UE Stretching    Spinal Stretching    Postural     REPEATED MOVEMENTS    NEURO RE-ED  CPT 74092 TOTAL TIME FOR SESSION Not performed   COORDINATION    POSTURAL RE-ED    PRE-GAIT ACTIVITIES    BALANCE TRAINING    Sitting Balance    Standing Balance    KINESIOTAPE    GAIT TRAINING  CPT 42521 TOTAL TIME FOR SESSION Not performed   Ambulation     Dynamic Gait    MANUAL   CPT 86698 TOTAL TIME FOR SESSION 38-52 Minutes   Stretching    Mobilization    Massage- Deep Tissue, Scar, Transverse Friction    Manual Lymph Drainage MLD L LE sequence starting with groin prep and full L LE drainage sequence; added Komprex foam cut out to medial aspect of L ankle under Farrow wrap but on top of liner.   Skin Care- Lotion/Wrapping    Measurement/Fitting NT - Volumetrics of L LE taken.  NT - Fit assessment of new Farrow foot/ankle wrap size L regular.    NT - Fit assess of Farrow basic leg wrap XL - garment is of good fit to L LE.  Ankle/foot wrap is too big (sent a XL long) and will request exchange.      NT - Trialed Tubigrip size G to L lower leg/foot as interim compression post MLD while waiting on garment.     Circaid reduction kit at ; insurance will only cover Farrow wrap so ordered via [facility].   Skin Stretching/Mobilization for Cording      GROUP  CPT 54408 TOTAL TIME FOR SESSION Not performed

## 2022-10-04 ENCOUNTER — HOSPITAL ENCOUNTER (OUTPATIENT)
Dept: PHYSICAL THERAPY | Age: 42
Setting detail: THERAPIES SERIES
Discharge: HOME | End: 2022-10-04
Attending: PODIATRIST
Payer: COMMERCIAL

## 2022-10-04 DIAGNOSIS — I89.0 LYMPHEDEMA: Primary | ICD-10-CM

## 2022-10-04 PROCEDURE — 97140 MANUAL THERAPY 1/> REGIONS: CPT | Mod: GP,U8

## 2022-10-04 PROCEDURE — 97110 THERAPEUTIC EXERCISES: CPT | Mod: GP,U8

## 2022-10-04 NOTE — PROGRESS NOTES
PT PROGRESS NOTE FOR OUTPATIENT THERAPY    Patient: Ramirez Montero MRN: [id number]  : 1980 41 y.o.  Referring Physician: Sanya Juarez Jr., *  Date of Visit: 10/4/2022      Certification Dates: 22 through 10/27/22    Recommended Frequency & Duration:  2 times/week for up to 8 weeks          Diagnosis:   1. Lymphedema        Chief Complaints:  Chief Complaint   Patient presents with    Pain    Difficulty Walking    Other    Self Care Difficulties    Decreased recreational/play activity       Precautions:   Precautions comments: Hx of DM  Existing Precautions/Restrictions: no known precautions/restrictions     TODAY'S VISIT:    Time In Session:  Start Time: 1200   General Information - 10/04/22 1204        Session Details    Document Type progress note     Mode of Treatment individual therapy;physical therapy     Patient/Family/Caregiver Comments/Observations Patient reports that his other garment may be at his house now.  He denies soreness today in his leg.        General Information    Onset of Illness/Injury or Date of Surgery 22     Referring Physician Dr. Juarez, DPM     History of present illness/functional impairment Patient presents with c/o B LE edema that has become more evident since his gastric bypass surgery in 2022.  He has since lost 100 lbs.  He recently saw his podiatrist who recommend consultation with a CLT.  Patient reports his edema has been there for a few years and is isolated to below the knees.  He states that his edema is effecting his work (works for [employer] long shifts) as his legs get hard and painful when he stands for a long time.  Tushar does state that his legs look best first thing in the morning or when he has a day off and can elevate his legs.  He has never used compression socks to date.     Existing Precautions/Restrictions no known precautions/restrictions     Precautions comments Hx of DM                  Pain/Vitals - 10/04/22 1201         Pain Assessment    Currently in pain Yes     Preferred Pain Scale number (Numeric Rating Pain Scale)     Pain Side/Orientation bilateral     Pain: Body location Leg     Pain Rating (0-10): Pre Activity 1        Pain Intervention    Intervention  MLD, compression     Post Intervention Comments See assessment                PT - 10/04/22 1204        Physical Therapy    Physical Therapy Specialty Lymphedema Program        PT Plan    Frequency of treatment 2 times/week     PT Duration 8 weeks     PT Cert From 09/01/22     PT Cert To 10/27/22     Date PT POC was sent to provider 09/01/22     Signed PT Plan of Care received?  Yes   POC refaxed 9/27.               Assessment and Plan - 10/04/22 1209        Assessment    Plan of Care reviewed and patient/family in agreement Yes     System Pathology/Pathophysiology Noted musculoskeletal;integumentary     Functional Limitations in Following Categories (PT Eval) self-care;home management;work;community/leisure     Rehab Potential/Prognosis good, to achieve stated therapy goals     Problem List pain;other (see comments)     Clinical Assessment Patient has made significant gains in LE lymphedema volume reduction and skin condition since start of PT.  His L LE volume may have reached a new baseline based on volumetrics taken over past 1 week.  Patient has been compliant with compression use to date to the L LE.  His R LE also has had volume reduction even though it has not been the primary focus of treatment to date.  We are currently waiting on a Farrow wrap for the R LE.  For long term maintenance and compression, patient would likely benefit from a knee high flat knit garment (ie. Elvarex) for B lower legs.  These will be fitted once max benefit of treatment phase has been met (ie. no more consistent volumetric or tissue changes measured over 2+ weeks.)     Plan and Recommendations Continue with CDT; initiate treatment to R LE once compression garment arrives.  Fit for flat  knit knee highs as above.     Planned Services CPT 70840 Manual therapy;CPT 63726 Self-care/Home management training;CPT 68612 Therapeutic activities;CPT 27850 Therapeutic exercises                 OBJECTIVE MEASUREMENTS/DATA:    Lymphedema     Lymphedema Assessment - 10/04/22 1200        Affected LE Measurements    Affected limb laterality Right     MTP 25.8 cm     -8 cm 31.7 cm     0 cm 34 cm     4 cm 49.4 cm     8 cm 55.8 cm     12 cm 58.3 cm     16 cm 62.2 cm     20 cm 64 cm     24 cm 66.3 cm     28 cm 65.5 cm     32 cm 61.8 cm     Affected LE Total Volume (ml) 708537.01 ml        Other Affected LE Circumference    Unaffected limb laterality Left     MTP 25.8 cm     -8 cm 30.5 cm     0 cm 34.5 cm     4 cm 47.5 cm     8 cm 55 cm     12 cm 60.5 cm     16 cm 65.2 cm     20 cm 68.3 cm     24 cm 72.7 cm     28 cm 73 cm     32 cm 65.1 cm     Other Affected LE Total Volume (ml) 452638.96 ml                       Today's Treatment::    Education provided:  Yes: Methods: Discussion    ..LYMPHEDEMA PT FLOWSHEET    PT Lymph Exercises Current Session Time   MODALITIES TOTAL TIME FOR SESSION Not performed   Heat (CPT 09641)    Vasocompression/Ice (CPT 92292)3    Estim/H-Wave (CPT 40553)    Mechanical Traction (CPT 17509)    THER ACT  CPT 23422 TOTAL TIME FOR SESSION Not performed   Bed Mobility    Transfer Training    Body Mechanics/Work Training    Patient Education NT - Patient edu to initiate Farrow use at night in addition to daytime use as tolerated.    NT - Patient education on Farrow wrap wear schedule starting with 2-3 hours during the day.  Provided with additional cotton stockinette base layers as Farrow stockinette a little snug at the top and at ankle crease.    NT - Patient education on compression options including pros and cons: compression bandaging versus Circaid reduction kit for treatment phase.  Patient elects to trial reduction kit and start with L LE.   THER EX  CPT 65994 TOTAL TIME FOR SESSION 8-22  Minutes   CARDIOVASCULAR    Nu Step L3 x 10 minutes following manual therapy with compression donned.   UBE    STRENGTHENING     Supine Ther-Ex    Standing Ther-Ex    Seated Ther-Ex    STRETCHING    Core Stabilization    LE Stretching    UE Stretching    Spinal Stretching    Postural     REPEATED MOVEMENTS    NEURO RE-ED  CPT 59684 TOTAL TIME FOR SESSION Not performed   COORDINATION    POSTURAL RE-ED    PRE-GAIT ACTIVITIES    BALANCE TRAINING    Sitting Balance    Standing Balance    KINESIOTAPE    GAIT TRAINING  CPT 88022 TOTAL TIME FOR SESSION Not performed   Ambulation     Dynamic Gait    MANUAL   CPT 41789 TOTAL TIME FOR SESSION 38-52 Minutes   Stretching    Mobilization    Massage- Deep Tissue, Scar, Transverse Friction    Manual Lymph Drainage MLD L LE sequence starting with groin prep and full L LE drainage sequence; added Komprex foam cut out to medial aspect of L ankle under Farrow wrap but on top of liner.   Skin Care- Lotion/Wrapping    Measurement/Fitting Volumetrics of B LE taken.  NT - Fit assessment of new Farrow foot/ankle wrap size L regular.    NT - Fit assess of Farrow basic leg wrap XL - garment is of good fit to L LE.  Ankle/foot wrap is too big (sent a XL long) and will request exchange.      NT - Trialed Tubigrip size G to L lower leg/foot as interim compression post MLD while waiting on garment.     Circaid reduction kit at ; insurance will only cover Farrow wrap so ordered via [facility].   Skin Stretching/Mobilization for Cording      GROUP  CPT 81650 TOTAL TIME FOR SESSION Not performed                     Goals Addressed                 This Visit's Progress     PT Lymph Goals        Patient will be able to teach back 3 skin care practices for infection reduction risk. Short Term 3 weeks Met   Patient will be able to tolerate L LE compression as recommended for 10 hours + a day. Short Term 3 weeks Met   Patient will demonstrate a L LE volume reduction of at least 10 %. Short Term 4 weeks Met    Patient will have reduced R LE volume by 5% or more Short Term 6 weeks Met   Patient will be Ind with skin care practices in order to reduce infection risk of B LE and avoid hospitalizations for cellulits. Long Term 8 weeks    Patient will be Ind with use of appropriate compression strategies to maintain B LE limb status and avoid progression of lymphedema to next stage. Long Term 8 weeks    Patient will have improved LLIS score of 20% impairment or better. Long Term 8 weeks

## 2022-10-04 NOTE — LETTER
[address]  [facility] [address]  [place] [facility] Fax: [phone]    PHYSICAL THERAPY UPDATE    Patient Name: Ramirez Montero    Provider: Jacqui Powell, PT  Referring Provider: Sanya Juarez Jr., *  PCP: Argentina Carr MD  [facility] / Plan: [employer] FIRST / Product Type: [place] Managed Care /   Medical Diagnosis: Lymphedema [I89.0]     Dear Dr. Juarez,    Thank you for the referral of your patient Ramirez Montero for physical therapy. I am writing to you today to provide you an update on the status of your patient. Please review the latest progress note below and the goals being addressed during this plan of care.     If you have any questions or concerns, please feel free to contact me. Once again, thank you for your referral and the opportunity to work with your patient.     Sincerely,  Jacqui Powell, PT    Rehab Potential: good, to achieve stated therapy goals        PT PROGRESS NOTE FOR OUTPATIENT THERAPY    Patient: Ramirez Montero MRN: [id number]  : 1980 41 y.o.  Referring Physician: Sanya Juarez Jr., *  Date of Visit: 10/4/2022      Certification Dates: 22 through 10/27/22    Recommended Frequency & Duration:  2 times/week for up to 8 weeks          Diagnosis:   1. Lymphedema        Chief Complaints:  Chief Complaint   Patient presents with    Pain    Difficulty Walking    Other    Self Care Difficulties    Decreased recreational/play activity       Precautions:   Precautions comments: Hx of DM  Existing Precautions/Restrictions: no known precautions/restrictions     TODAY'S VISIT:    Time In Session:  Start Time: 1200   General Information - 10/04/22 1204        Session Details    Document Type progress note     Mode of Treatment individual therapy;physical therapy     Patient/Family/Caregiver Comments/Observations Patient reports that his other garment may be at his house now.  He denies soreness today in his leg.        General  Information    Onset of Illness/Injury or Date of Surgery 03/03/22     Referring Physician Dr. Juraez, MAURICIO     History of present illness/functional impairment Patient presents with c/o B LE edema that has become more evident since his gastric bypass surgery in March 2022.  He has since lost 100 lbs.  He recently saw his podiatrist who recommend consultation with a CLT.  Patient reports his edema has been there for a few years and is isolated to below the knees.  He states that his edema is effecting his work (works for [employer] long shifts) as his legs get hard and painful when he stands for a long time.  Tushar does state that his legs look best first thing in the morning or when he has a day off and can elevate his legs.  He has never used compression socks to date.     Existing Precautions/Restrictions no known precautions/restrictions     Precautions comments Hx of DM                  Pain/Vitals - 10/04/22 1204        Pain Assessment    Currently in pain Yes     Preferred Pain Scale number (Numeric Rating Pain Scale)     Pain Side/Orientation bilateral     Pain: Body location Leg     Pain Rating (0-10): Pre Activity 1        Pain Intervention    Intervention  MLD, compression     Post Intervention Comments See assessment                PT - 10/04/22 1204        Physical Therapy    Physical Therapy Specialty Lymphedema Program        PT Plan    Frequency of treatment 2 times/week     PT Duration 8 weeks     PT Cert From 09/01/22     PT Cert To 10/27/22     Date PT POC was sent to provider 09/01/22     Signed PT Plan of Care received?  Yes   POC refaxed 9/27.               Assessment and Plan - 10/04/22 1209        Assessment    Plan of Care reviewed and patient/family in agreement Yes     System Pathology/Pathophysiology Noted musculoskeletal;integumentary     Functional Limitations in Following Categories (PT Eval) self-care;home management;work;community/leisure     Rehab Potential/Prognosis good, to achieve  stated therapy goals     Problem List pain;other (see comments)     Clinical Assessment Patient has made significant gains in LE lymphedema volume reduction and skin condition since start of PT.  His L LE volume may have reached a new baseline based on volumetrics taken over past 1 week.  Patient has been compliant with compression use to date to the L LE.  His R LE also has had volume reduction even though it has not been the primary focus of treatment to date.  We are currently waiting on a Farrow wrap for the R LE.  For long term maintenance and compression, patient would likely benefit from a knee high flat knit garment (ie. Elvarex) for B lower legs.  These will be fitted once max benefit of treatment phase has been met (ie. no more consistent volumetric or tissue changes measured over 2+ weeks.)     Plan and Recommendations Continue with CDT; initiate treatment to R LE once compression garment arrives.  Fit for flat knit knee highs as above.     Planned Services CPT 94682 Manual therapy;CPT 31504 Self-care/Home management training;CPT 40597 Therapeutic activities;CPT 55788 Therapeutic exercises                 OBJECTIVE MEASUREMENTS/DATA:    Lymphedema     Lymphedema Assessment - 10/04/22 1200        Affected LE Measurements    Affected limb laterality Right     MTP 25.8 cm     -8 cm 31.7 cm     0 cm 34 cm     4 cm 49.4 cm     8 cm 55.8 cm     12 cm 58.3 cm     16 cm 62.2 cm     20 cm 64 cm     24 cm 66.3 cm     28 cm 65.5 cm     32 cm 61.8 cm     Affected LE Total Volume (ml) 982312.01 ml        Other Affected LE Circumference    Unaffected limb laterality Left     MTP 25.8 cm     -8 cm 30.5 cm     0 cm 34.5 cm     4 cm 47.5 cm     8 cm 55 cm     12 cm 60.5 cm     16 cm 65.2 cm     20 cm 68.3 cm     24 cm 72.7 cm     28 cm 73 cm     32 cm 65.1 cm     Other Affected LE Total Volume (ml) 233981.96 ml                       Today's Treatment::    Education provided:  Yes: Methods: Discussion    ..LYMPHEDEMA PT  FLOWSHEET    PT Lymph Exercises Current Session Time   MODALITIES TOTAL TIME FOR SESSION Not performed   Heat (CPT 65563)    Vasocompression/Ice (CPT 66592)3    Estim/H-Wave (CPT 56900)    Mechanical Traction (CPT 95109)    THER ACT  CPT 12502 TOTAL TIME FOR SESSION Not performed   Bed Mobility    Transfer Training    Body Mechanics/Work Training    Patient Education NT - Patient edu to initiate Farrow use at night in addition to daytime use as tolerated.    NT - Patient education on Farrow wrap wear schedule starting with 2-3 hours during the day.  Provided with additional cotton stockinette base layers as Farrow stockinette a little snug at the top and at ankle crease.    NT - Patient education on compression options including pros and cons: compression bandaging versus Circaid reduction kit for treatment phase.  Patient elects to trial reduction kit and start with L LE.   THER EX  CPT 94958 TOTAL TIME FOR SESSION 8-22 Minutes   CARDIOVASCULAR    Nu Step L3 x 10 minutes following manual therapy with compression donned.   UBE    STRENGTHENING     Supine Ther-Ex    Standing Ther-Ex    Seated Ther-Ex    STRETCHING    Core Stabilization    LE Stretching    UE Stretching    Spinal Stretching    Postural     REPEATED MOVEMENTS    NEURO RE-ED  CPT 16348 TOTAL TIME FOR SESSION Not performed   COORDINATION    POSTURAL RE-ED    PRE-GAIT ACTIVITIES    BALANCE TRAINING    Sitting Balance    Standing Balance    KINESIOTAPE    GAIT TRAINING  CPT 04737 TOTAL TIME FOR SESSION Not performed   Ambulation     Dynamic Gait    MANUAL   CPT 30089 TOTAL TIME FOR SESSION 38-52 Minutes   Stretching    Mobilization    Massage- Deep Tissue, Scar, Transverse Friction    Manual Lymph Drainage MLD L LE sequence starting with groin prep and full L LE drainage sequence; added Komprex foam cut out to medial aspect of L ankle under Farrow wrap but on top of liner.   Skin Care- Lotion/Wrapping    Measurement/Fitting Volumetrics of B LE taken.  NT -  Fit assessment of new [facility] foot/ankle wrap size L regular.    NT - Fit assess of [facility] basic leg wrap XL - garment is of good fit to L LE.  Ankle/foot wrap is too big (sent a XL long) and will request exchange.      NT - Trialed Tubigrip size G to L lower leg/foot as interim compression post MLD while waiting on garment.     Circaid reduction kit at ; [facility].   Skin Stretching/Mobilization for Cording      GROUP  CPT 92745 TOTAL TIME FOR SESSION Not performed                     Goals Addressed                 This Visit's Progress     PT Lymph Goals        Patient will be able to teach back 3 skin care practices for infection reduction risk. Short Term 3 weeks Met   Patient will be able to tolerate L LE compression as recommended for 10 hours + a day. Short Term 3 weeks Met   Patient will demonstrate a L LE volume reduction of at least 10 %. Short Term 4 weeks Met   Patient will have reduced R LE volume by 5% or more Short Term 6 weeks Met   Patient will be Ind with skin care practices in order to reduce infection risk of B LE and avoid hospitalizations for cellulits. Long Term 8 weeks    Patient will be Ind with use of appropriate compression strategies to maintain B LE limb status and avoid progression of lymphedema to next stage. Long Term 8 weeks    Patient will have improved LLIS score of 20% impairment or better. Long Term 8 weeks

## 2022-10-04 NOTE — OP PT TREATMENT LOG
..LYMPHEDEMA PT FLOWSHEET    PT Lymph Exercises Current Session Time   MODALITIES TOTAL TIME FOR SESSION Not performed   Heat (CPT 60402)    Vasocompression/Ice (CPT 24431)3    Estim/H-Wave (CPT 32876)    Mechanical Traction (CPT 17533)    THER ACT  CPT 47034 TOTAL TIME FOR SESSION Not performed   Bed Mobility    Transfer Training    Body Mechanics/Work Training    Patient Education NT - Patient edu to initiate Farrow use at night in addition to daytime use as tolerated.    NT - Patient education on Farrow wrap wear schedule starting with 2-3 hours during the day.  Provided with additional cotton stockinette base layers as Farrow stockinette a little snug at the top and at ankle crease.    NT - Patient education on compression options including pros and cons: compression bandaging versus Circaid reduction kit for treatment phase.  Patient elects to trial reduction kit and start with L LE.   THER EX  CPT 97833 TOTAL TIME FOR SESSION 8-22 Minutes   CARDIOVASCULAR    Nu Step L3 x 10 minutes following manual therapy with compression donned.   UBE    STRENGTHENING     Supine Ther-Ex    Standing Ther-Ex    Seated Ther-Ex    STRETCHING    Core Stabilization    LE Stretching    UE Stretching    Spinal Stretching    Postural     REPEATED MOVEMENTS    NEURO RE-ED  CPT 43028 TOTAL TIME FOR SESSION Not performed   COORDINATION    POSTURAL RE-ED    PRE-GAIT ACTIVITIES    BALANCE TRAINING    Sitting Balance    Standing Balance    KINESIOTAPE    GAIT TRAINING  CPT 59084 TOTAL TIME FOR SESSION Not performed   Ambulation     Dynamic Gait    MANUAL   CPT 46858 TOTAL TIME FOR SESSION 38-52 Minutes   Stretching    Mobilization    Massage- Deep Tissue, Scar, Transverse Friction    Manual Lymph Drainage MLD L LE sequence starting with groin prep and full L LE drainage sequence; added Komprex foam cut out to medial aspect of L ankle under Farrow wrap but on top of liner.   Skin Care- Lotion/Wrapping    Measurement/Fitting Volumetrics of B  LE taken.  NT - Fit assessment of new Farrow foot/ankle wrap size L regular.    NT - Fit assess of Farrow basic leg wrap XL - garment is of good fit to L LE.  Ankle/foot wrap is too big (sent a XL long) and will request exchange.      NT - Trialed Tubigrip size G to L lower leg/foot as interim compression post MLD while waiting on garment.     Circaid reduction kit at ; insurance will only cover Farrow wrap so ordered via [facility].   Skin Stretching/Mobilization for Cording      GROUP  CPT 58147 TOTAL TIME FOR SESSION Not performed

## 2022-10-06 ENCOUNTER — HOSPITAL ENCOUNTER (OUTPATIENT)
Dept: PHYSICAL THERAPY | Age: 42
Setting detail: THERAPIES SERIES
Discharge: HOME | End: 2022-10-06
Attending: PODIATRIST
Payer: COMMERCIAL

## 2022-10-06 DIAGNOSIS — I89.0 LYMPHEDEMA: Primary | ICD-10-CM

## 2022-10-06 PROCEDURE — 97110 THERAPEUTIC EXERCISES: CPT | Mod: GP,U8

## 2022-10-06 PROCEDURE — 97140 MANUAL THERAPY 1/> REGIONS: CPT | Mod: GP,U8

## 2022-10-06 NOTE — OP PT TREATMENT LOG
..LYMPHEDEMA PT FLOWSHEET    PT Lymph Exercises Current Session Time   MODALITIES TOTAL TIME FOR SESSION Not performed   Heat (CPT 19420)    Vasocompression/Ice (CPT 53421)3    Estim/H-Wave (CPT 71340)    Mechanical Traction (CPT 08871)    THER ACT  CPT 84240 TOTAL TIME FOR SESSION Not performed   Bed Mobility    Transfer Training    Body Mechanics/Work Training    Patient Education NT - Patient edu to initiate Farrow use at night in addition to daytime use as tolerated.    NT - Patient education on Farrow wrap wear schedule starting with 2-3 hours during the day.  Provided with additional cotton stockinette base layers as Farrow stockinette a little snug at the top and at ankle crease.    NT - Patient education on compression options including pros and cons: compression bandaging versus Circaid reduction kit for treatment phase.  Patient elects to trial reduction kit and start with L LE.   THER EX  CPT 78244 TOTAL TIME FOR SESSION 8-22 Minutes   CARDIOVASCULAR    Nu Step L3 x 15 minutes following manual therapy with compression donned to B LEs   UBE    STRENGTHENING     Supine Ther-Ex    Standing Ther-Ex    Seated Ther-Ex    STRETCHING    Core Stabilization    LE Stretching    UE Stretching    Spinal Stretching    Postural     REPEATED MOVEMENTS    NEURO RE-ED  CPT 29448 TOTAL TIME FOR SESSION Not performed   COORDINATION    POSTURAL RE-ED    PRE-GAIT ACTIVITIES    BALANCE TRAINING    Sitting Balance    Standing Balance    KINESIOTAPE    GAIT TRAINING  CPT 18647 TOTAL TIME FOR SESSION Not performed   Ambulation     Dynamic Gait    MANUAL   CPT 68057 TOTAL TIME FOR SESSION 38-52 Minutes   Stretching    Mobilization    Massage- Deep Tissue, Scar, Transverse Friction    Manual Lymph Drainage MLD R LE sequence starting with groin prep and full R LE drainage sequence   Skin Care- Lotion/Wrapping    Measurement/Fitting Fit assessment of new Farrow lower leg wrap for R LE with garment donned following MLD.    NT - Fit  assess of Farrow basic leg wrap XL - garment is of good fit to L LE.  Ankle/foot wrap is too big (sent a XL long) and will request exchange.      NT - Trialed Tubigrip size G to L lower leg/foot as interim compression post MLD while waiting on garment.     Circaid reduction kit at ; insurance will only cover Farrow wrap so ordered via [facility].   Skin Stretching/Mobilization for Cording      GROUP  CPT 28379 TOTAL TIME FOR SESSION Not performed

## 2022-10-06 NOTE — PROGRESS NOTES
PT DAILY NOTE FOR OUTPATIENT THERAPY    Patient: Ramirez Montero MRN: [id number]  : 1980 41 y.o.  Referring Physician: Sanya Juarez Jr., *  Date of Visit: 10/6/2022    Certification Dates: 22 through 10/27/22    Diagnosis:   1. Lymphedema        Chief Complaints:  B LE lymphedema    Precautions:   Precautions comments: Hx of DM  Existing Precautions/Restrictions: no known precautions/restrictions     TODAY'S VISIT    Time In Session:  Start Time: 1453  Stop Time: 1603  Time Calculation (min): 70 min   History/Vitals/Pain/Encounter Info - 10/06/22 1502        Injury History/Precautions/Daily Required Info    Document Type daily treatment     Primary Therapist Jacqui Powell, PT, MSPT, CLT - [facility]     Chief Complaint/Reason for Visit  B LE lymphedema     Onset of Illness/Injury or Date of Surgery 22     Referring Physician Dr. Juarez, DPM     Existing Precautions/Restrictions no known precautions/restrictions     Precautions comments Hx of DM     History of present illness/functional impairment Patient presents with c/o B LE edema that has become more evident since his gastric bypass surgery in 2022.  He has since lost 100 lbs.  He recently saw his podiatrist who recommend consultation with a CLT.  Patient reports his edema has been there for a few years and is isolated to below the knees.  He states that his edema is effecting his work (works for [employer] long shifts) as his legs get hard and painful when he stands for a long time.  Tushar does state that his legs look best first thing in the morning or when he has a day off and can elevate his legs.  He has never used compression socks to date.     Patient/Family/Caregiver Comments/Observations Patient presents with Farrow wrap for his R LE.  Continues to do well with L LE compression.  Waiting on preauth for flat knit garments at this time.     Patient reported fall since last visit No        Pain Assessment    Currently in pain Yes      Preferred Pain Scale number (Numeric Rating Pain Scale)     Pain Side/Orientation bilateral     Pain: Body location Leg     Pain Rating (0-10): Pre Activity 2     Pain Description constant;sore        Pain Intervention    Intervention  MLD, Compression     Post Intervention Comments See assessment                Daily Treatment Assessment and Plan - 10/06/22 1550        Daily Treatment Assessment and Plan    Progress toward goals Progressing     Daily Outcome Summary Patient continues to have maintained L LE volume with improved tissue texture.  Switched focus to R LE today as Farrow wrap for lower leg just arrived.  Fit is appropriate and patient confirms comfort with garment donned.  Will continue with MLD/Farrow wrap use until preauth approved for Elvarex flat knit for first L LE and then R LE when max reduction achieved/patient volume reduction level out.     Plan and Recommendations Reassess B LE volume next week; follow up with B compression use.                     OBJECTIVE DATA TAKEN TODAY:    None taken    Today's Treatment:    ..LYMPHEDEMA PT FLOWSHEET    PT Lymph Exercises Current Session Time   MODALITIES TOTAL TIME FOR SESSION Not performed   Heat (CPT 15354)    Vasocompression/Ice (CPT 77837)3    Estim/H-Wave (CPT 25511)    Mechanical Traction (CPT 69341)    THER ACT  CPT 06263 TOTAL TIME FOR SESSION Not performed   Bed Mobility    Transfer Training    Body Mechanics/Work Training    Patient Education NT - Patient edu to initiate Farrow use at night in addition to daytime use as tolerated.    NT - Patient education on Farrow wrap wear schedule starting with 2-3 hours during the day.  Provided with additional cotton stockinette base layers as Farrow stockinette a little snug at the top and at ankle crease.    NT - Patient education on compression options including pros and cons: compression bandaging versus Circaid reduction kit for treatment phase.  Patient elects to trial reduction kit and start with  L LE.   THER EX  CPT 29163 TOTAL TIME FOR SESSION 8-22 Minutes   CARDIOVASCULAR    Nu Step L3 x 15 minutes following manual therapy with compression donned to B LEs   UBE    STRENGTHENING     Supine Ther-Ex    Standing Ther-Ex    Seated Ther-Ex    STRETCHING    Core Stabilization    LE Stretching    UE Stretching    Spinal Stretching    Postural     REPEATED MOVEMENTS    NEURO RE-ED  CPT 46813 TOTAL TIME FOR SESSION Not performed   COORDINATION    POSTURAL RE-ED    PRE-GAIT ACTIVITIES    BALANCE TRAINING    Sitting Balance    Standing Balance    KINESIOTAPE    GAIT TRAINING  CPT 61059 TOTAL TIME FOR SESSION Not performed   Ambulation     Dynamic Gait    MANUAL   CPT 10088 TOTAL TIME FOR SESSION 38-52 Minutes   Stretching    Mobilization    Massage- Deep Tissue, Scar, Transverse Friction    Manual Lymph Drainage MLD R LE sequence starting with groin prep and full R LE drainage sequence   Skin Care- Lotion/Wrapping    Measurement/Fitting Fit assessment of new Farrow lower leg wrap for R LE with garment donned following MLD.    NT - Fit assess of Farrow basic leg wrap XL - garment is of good fit to L LE.  Ankle/foot wrap is too big (sent a XL long) and will request exchange.      NT - Trialed Tubigrip size G to L lower leg/foot as interim compression post MLD while waiting on garment.     Circaid reduction kit at ; insurance will only cover Farrow wrap so ordered via [facility].   Skin Stretching/Mobilization for Cording      GROUP  CPT 48903 TOTAL TIME FOR SESSION Not performed

## 2022-10-11 ENCOUNTER — HOSPITAL ENCOUNTER (OUTPATIENT)
Dept: PHYSICAL THERAPY | Age: 42
Setting detail: THERAPIES SERIES
Discharge: HOME | End: 2022-10-11
Attending: PODIATRIST
Payer: COMMERCIAL

## 2022-10-11 DIAGNOSIS — I89.0 LYMPHEDEMA: Primary | ICD-10-CM

## 2022-10-11 PROCEDURE — 97140 MANUAL THERAPY 1/> REGIONS: CPT | Mod: GP,U8

## 2022-10-11 PROCEDURE — 97110 THERAPEUTIC EXERCISES: CPT | Mod: GP,U8

## 2022-10-11 NOTE — PROGRESS NOTES
PT DAILY NOTE FOR OUTPATIENT THERAPY    Patient: Ramirez Montero MRN: [id number]  : 1980 41 y.o.  Referring Physician: Sanya Juarez Jr., *  Date of Visit: 10/11/2022    Certification Dates: 22 through 10/27/22    Diagnosis:   1. Lymphedema        Chief Complaints:  B LE lymphedema    Precautions:   Precautions comments: Hx of DM  Existing Precautions/Restrictions: no known precautions/restrictions     TODAY'S VISIT    Time In Session:  Start Time: 1200  Stop Time: 1258  Time Calculation (min): 58 min   History/Vitals/Pain/Encounter Info - 10/11/22 1203        Injury History/Precautions/Daily Required Info    Document Type daily treatment     Primary Therapist Jacqui Powell, PT, MSPT, CLT - [facility]     Chief Complaint/Reason for Visit  B LE lymphedema     Onset of Illness/Injury or Date of Surgery 22     Referring Physician Dr. Juarez, DPM     Existing Precautions/Restrictions no known precautions/restrictions     Precautions comments Hx of DM     History of present illness/functional impairment Patient presents with c/o B LE edema that has become more evident since his gastric bypass surgery in 2022.  He has since lost 100 lbs.  He recently saw his podiatrist who recommend consultation with a CLT.  Patient reports his edema has been there for a few years and is isolated to below the knees.  He states that his edema is effecting his work (works for [employer] long shifts) as his legs get hard and painful when he stands for a long time.  Tushar does state that his legs look best first thing in the morning or when he has a day off and can elevate his legs.  He has never used compression socks to date.     Patient/Family/Caregiver Comments/Observations Patient presents with both Farrow wraps donned.  Denies issue.  Preauth approved for flat knit garments.     Patient reported fall since last visit No        Pain Assessment    Currently in pain Yes     Preferred Pain Scale number (Numeric Rating Pain  Scale)     Pain Side/Orientation bilateral     Pain: Body location Leg     Pain Rating (0-10): Pre Activity 2     Pain Description throbbing        Pain Intervention    Intervention  MLD, garment fitting, compression     Post Intervention Comments See assessment                Daily Treatment Assessment and Plan - 10/11/22 1203        Daily Treatment Assessment and Plan    Progress toward goals Progressing     Daily Outcome Summary Patient fitted today for L LE Elvarex as preauth came through.  R LE volume assessed with continued reduction as compared to last week.  Will continue to monitor R LE for volume and tissue texture improvements prior to fitting that LE for an Elvarex knee high as well.     Plan and Recommendations Continue with volume assessments as appropriate.                     OBJECTIVE DATA TAKEN TODAY:    Lymphedema     Lymphedema Assessment - 10/11/22 1200        Affected LE Measurements    Affected limb laterality Right     MTP 25.2 cm     -8 cm 29.5 cm     0 cm 33.2 cm     4 cm 49.4 cm     8 cm 55 cm     12 cm 56.3 cm     16 cm 62 cm     20 cm 65.2 cm     24 cm 66 cm     28 cm 65.7 cm     32 cm 61.7 cm     Affected LE Total Volume (ml) 589907.79 ml                 Today's Treatment:    ..LYMPHEDEMA PT FLOWSHEET    PT Lymph Exercises Current Session Time   MODALITIES TOTAL TIME FOR SESSION Not performed   Heat (CPT 25028)    Vasocompression/Ice (CPT 26431)3    Estim/H-Wave (CPT 92103)    Mechanical Traction (CPT 31889)    THER ACT  CPT 33908 TOTAL TIME FOR SESSION Not performed   Bed Mobility    Transfer Training    Body Mechanics/Work Training    Patient Education NT - Patient edu to initiate Farrow use at night in addition to daytime use as tolerated.    NT - Patient education on Farrow wrap wear schedule starting with 2-3 hours during the day.  Provided with additional cotton stockinette base layers as Farrow stockinette a little snug at the top and at ankle crease.    NT - Patient education on  compression options including pros and cons: compression bandaging versus Circaid reduction kit for treatment phase.  Patient elects to trial reduction kit and start with L LE.   THER EX  CPT 90856 TOTAL TIME FOR SESSION 8-22 Minutes   CARDIOVASCULAR    Nu Step L3 x 15 minutes following manual therapy with compression donned to B LEs   UBE    STRENGTHENING     Supine Ther-Ex    Standing Ther-Ex    Seated Ther-Ex    STRETCHING    Core Stabilization    LE Stretching    UE Stretching    Spinal Stretching    Postural     REPEATED MOVEMENTS    NEURO RE-ED  CPT 73445 TOTAL TIME FOR SESSION Not performed   COORDINATION    POSTURAL RE-ED    PRE-GAIT ACTIVITIES    BALANCE TRAINING    Sitting Balance    Standing Balance    KINESIOTAPE    GAIT TRAINING  CPT 55735 TOTAL TIME FOR SESSION Not performed   Ambulation     Dynamic Gait    MANUAL   CPT 49717 TOTAL TIME FOR SESSION 38-52 Minutes   Stretching    Mobilization    Massage- Deep Tissue, Scar, Transverse Friction    Manual Lymph Drainage MLD R LE sequence starting with groin prep and full R LE drainage sequence   Skin Care- Lotion/Wrapping    Measurement/Fitting Patient fit for custom knee high Elvarex Cl 3F for L LE and order sent to [facility]  R LE volumetrics taken.    NT - Fit assessment of new Farrow lower leg wrap for R LE with garment donned following MLD.    NT - Fit assess of Farrow basic leg wrap XL - garment is of good fit to L LE.  Ankle/foot wrap is too big (sent a XL long) and will request exchange.      NT - Trialed Tubigrip size G to L lower leg/foot as interim compression post MLD while waiting on garment.     Circaid reduction kit at ; insurance will only cover Farrow wrap so ordered via [facility].   Skin Stretching/Mobilization for Cording      GROUP  CPT 83468 TOTAL TIME FOR SESSION Not performed

## 2022-10-11 NOTE — OP PT TREATMENT LOG
..LYMPHEDEMA PT FLOWSHEET    PT Lymph Exercises Current Session Time   MODALITIES TOTAL TIME FOR SESSION Not performed   Heat (CPT 88681)    Vasocompression/Ice (CPT 17855)3    Estim/H-Wave (CPT 30704)    Mechanical Traction (CPT 09770)    THER ACT  CPT 34600 TOTAL TIME FOR SESSION Not performed   Bed Mobility    Transfer Training    Body Mechanics/Work Training    Patient Education NT - Patient edu to initiate Farrow use at night in addition to daytime use as tolerated.    NT - Patient education on Farrow wrap wear schedule starting with 2-3 hours during the day.  Provided with additional cotton stockinette base layers as Farrow stockinette a little snug at the top and at ankle crease.    NT - Patient education on compression options including pros and cons: compression bandaging versus Circaid reduction kit for treatment phase.  Patient elects to trial reduction kit and start with L LE.   THER EX  CPT 84762 TOTAL TIME FOR SESSION 8-22 Minutes   CARDIOVASCULAR    Nu Step L3 x 15 minutes following manual therapy with compression donned to B LEs   UBE    STRENGTHENING     Supine Ther-Ex    Standing Ther-Ex    Seated Ther-Ex    STRETCHING    Core Stabilization    LE Stretching    UE Stretching    Spinal Stretching    Postural     REPEATED MOVEMENTS    NEURO RE-ED  CPT 06379 TOTAL TIME FOR SESSION Not performed   COORDINATION    POSTURAL RE-ED    PRE-GAIT ACTIVITIES    BALANCE TRAINING    Sitting Balance    Standing Balance    KINESIOTAPE    GAIT TRAINING  CPT 85678 TOTAL TIME FOR SESSION Not performed   Ambulation     Dynamic Gait    MANUAL   CPT 15504 TOTAL TIME FOR SESSION 38-52 Minutes   Stretching    Mobilization    Massage- Deep Tissue, Scar, Transverse Friction    Manual Lymph Drainage MLD R LE sequence starting with groin prep and full R LE drainage sequence   Skin Care- Lotion/Wrapping    Measurement/Fitting Patient fit for custom knee high Elvarex Cl 3F for L LE and order sent to [facility] -   R LE volumetrics  taken.    NT - Fit assessment of new Farrow lower leg wrap for R LE with garment donned following MLD.    NT - Fit assess of Farrow basic leg wrap XL - garment is of good fit to L LE.  Ankle/foot wrap is too big (sent a XL long) and will request exchange.      NT - Trialed Tubigrip size G to L lower leg/foot as interim compression post MLD while waiting on garment.     Circaid reduction kit at ; insurance will only cover Farrow wrap so ordered via [facility].   Skin Stretching/Mobilization for Cording      GROUP  CPT 87278 TOTAL TIME FOR SESSION Not performed

## 2022-10-13 ENCOUNTER — HOSPITAL ENCOUNTER (OUTPATIENT)
Dept: PHYSICAL THERAPY | Age: 42
Setting detail: THERAPIES SERIES
Discharge: HOME | End: 2022-10-13
Attending: PODIATRIST
Payer: COMMERCIAL

## 2022-10-13 DIAGNOSIS — I89.0 LYMPHEDEMA: Primary | ICD-10-CM

## 2022-10-13 PROCEDURE — 97110 THERAPEUTIC EXERCISES: CPT | Mod: GP,U8

## 2022-10-13 PROCEDURE — 97140 MANUAL THERAPY 1/> REGIONS: CPT | Mod: GP,U8

## 2022-10-13 NOTE — OP PT TREATMENT LOG
..LYMPHEDEMA PT FLOWSHEET    PT Lymph Exercises Current Session Time   MODALITIES TOTAL TIME FOR SESSION Not performed   Heat (CPT 22797)    Vasocompression/Ice (CPT 30128)3    Estim/H-Wave (CPT 73271)    Mechanical Traction (CPT 15178)    THER ACT  CPT 02221 TOTAL TIME FOR SESSION Not performed   Bed Mobility    Transfer Training    Body Mechanics/Work Training    Patient Education NT - Patient edu to initiate Farrow use at night in addition to daytime use as tolerated.    NT - Patient education on Farrow wrap wear schedule starting with 2-3 hours during the day.  Provided with additional cotton stockinette base layers as Farrow stockinette a little snug at the top and at ankle crease.    NT - Patient education on compression options including pros and cons: compression bandaging versus Circaid reduction kit for treatment phase.  Patient elects to trial reduction kit and start with L LE.   THER EX  CPT 59036 TOTAL TIME FOR SESSION 8-22 Minutes   CARDIOVASCULAR    Nu Step L3 x 15 minutes following manual therapy with compression donned to B LEs   UBE    STRENGTHENING     Supine Ther-Ex    Standing Ther-Ex    Seated Ther-Ex    STRETCHING    Core Stabilization    LE Stretching    UE Stretching    Spinal Stretching    Postural     REPEATED MOVEMENTS    NEURO RE-ED  CPT 03051 TOTAL TIME FOR SESSION Not performed   COORDINATION    POSTURAL RE-ED    PRE-GAIT ACTIVITIES    BALANCE TRAINING    Sitting Balance    Standing Balance    KINESIOTAPE    GAIT TRAINING  CPT 05487 TOTAL TIME FOR SESSION Not performed   Ambulation     Dynamic Gait    MANUAL   CPT 11852 TOTAL TIME FOR SESSION 38-52 Minutes   Stretching    Mobilization    Massage- Deep Tissue, Scar, Transverse Friction    Manual Lymph Drainage MLD R LE sequence starting with groin prep and full R LE drainage sequence   Skin Care- Lotion/Wrapping    Measurement/Fitting Patient fit for custom knee high Elvarex Cl 3F for R LE and order sent to [facility] -     R LE volumetrics  taken.    NT - Fit assessment of new Farrow lower leg wrap for R LE with garment donned following MLD.    NT - Fit assess of Farrow basic leg wrap XL - garment is of good fit to L LE.  Ankle/foot wrap is too big (sent a XL long) and will request exchange.      NT - Trialed Tubigrip size G to L lower leg/foot as interim compression post MLD while waiting on garment.     Circaid reduction kit at ; insurance will only cover Farrow wrap so ordered via [facility].   Skin Stretching/Mobilization for Cording      GROUP  CPT 23128 TOTAL TIME FOR SESSION Not performed

## 2022-10-13 NOTE — PROGRESS NOTES
PT DAILY NOTE FOR OUTPATIENT THERAPY    Patient: Ramirez Montero MRN: [id number]  : 1980 41 y.o.  Referring Physician: Sanya Juarez Jr., *  Date of Visit: 10/13/2022    Certification Dates: 22 through 10/27/22    Diagnosis:   1. Lymphedema        Chief Complaints:  B LE lymphedema    Precautions:   Precautions comments: Hx of DM  Existing Precautions/Restrictions: no known precautions/restrictions     TODAY'S VISIT    Time In Session:  Start Time: 1501  Stop Time: 1601  Time Calculation (min): 60 min   History/Vitals/Pain/Encounter Info - 10/13/22 1504        Injury History/Precautions/Daily Required Info    Document Type daily treatment     Primary Therapist Jacqui Powell, PT, MSPT, CLT - [facility]     Chief Complaint/Reason for Visit  B LE lymphedema     Onset of Illness/Injury or Date of Surgery 22     Referring Physician Dr. Juarez, DPM     Existing Precautions/Restrictions no known precautions/restrictions     Precautions comments Hx of DM     History of present illness/functional impairment Patient presents with c/o B LE edema that has become more evident since his gastric bypass surgery in 2022.  He has since lost 100 lbs.  He recently saw his podiatrist who recommend consultation with a CLT.  Patient reports his edema has been there for a few years and is isolated to below the knees.  He states that his edema is effecting his work (works for [employer] long shifts) as his legs get hard and painful when he stands for a long time.  Tushar does state that his legs look best first thing in the morning or when he has a day off and can elevate his legs.  He has never used compression socks to date.     Patient/Family/Caregiver Comments/Observations Patient denies new issues since last visit.     Patient reported fall since last visit No        Pain Assessment    Currently in pain Yes     Preferred Pain Scale number (Numeric Rating Pain Scale)     Pain Side/Orientation bilateral     Pain: Body  location Leg     Pain Rating (0-10): Pre Activity 2     Pain Rating (0-10): Activity 6     Pain Rating (0-10): Post Activity 3     Pain Description throbbing;sore        Pain Intervention    Intervention  MLD, compression     Post Intervention Comments See assessment                Daily Treatment Assessment and Plan - 10/13/22 1542        Daily Treatment Assessment and Plan    Progress toward goals Progressing     Daily Outcome Summary Patient with well maintained R LE reduction and fitted for Elvarex garment today for R LE per preauth date allowance and order submitted to [facility].  Overall patient progressing well and pleased with LE results to date.  He continues to be compliant with skin care and B LE compression use.     Plan and Recommendations Await Elvarex garments; continue with CDT and Farrow use in interim.                     OBJECTIVE DATA TAKEN TODAY:    None taken    Today's Treatment:    ..LYMPHEDEMA PT FLOWSHEET    PT Lymph Exercises Current Session Time   MODALITIES TOTAL TIME FOR SESSION Not performed   Heat (CPT 42588)    Vasocompression/Ice (CPT 19188)3    Estim/H-Wave (CPT 94953)    Mechanical Traction (CPT 95893)    THER ACT  CPT 86790 TOTAL TIME FOR SESSION Not performed   Bed Mobility    Transfer Training    Body Mechanics/Work Training    Patient Education NT - Patient edu to initiate Farrow use at night in addition to daytime use as tolerated.    NT - Patient education on Farrow wrap wear schedule starting with 2-3 hours during the day.  Provided with additional cotton stockinette base layers as Farrow stockinette a little snug at the top and at ankle crease.    NT - Patient education on compression options including pros and cons: compression bandaging versus Circaid reduction kit for treatment phase.  Patient elects to trial reduction kit and start with L LE.   THER EX  CPT 95748 TOTAL TIME FOR SESSION 8-22 Minutes   CARDIOVASCULAR    Nu Step L3 x 15 minutes following manual therapy with  compression donned to B LEs   UBE    STRENGTHENING     Supine Ther-Ex    Standing Ther-Ex    Seated Ther-Ex    STRETCHING    Core Stabilization    LE Stretching    UE Stretching    Spinal Stretching    Postural     REPEATED MOVEMENTS    NEURO RE-ED  CPT 15841 TOTAL TIME FOR SESSION Not performed   COORDINATION    POSTURAL RE-ED    PRE-GAIT ACTIVITIES    BALANCE TRAINING    Sitting Balance    Standing Balance    KINESIOTAPE    GAIT TRAINING  CPT 60020 TOTAL TIME FOR SESSION Not performed   Ambulation     Dynamic Gait    MANUAL   CPT 38432 TOTAL TIME FOR SESSION 38-52 Minutes   Stretching    Mobilization    Massage- Deep Tissue, Scar, Transverse Friction    Manual Lymph Drainage MLD R LE sequence starting with groin prep and full R LE drainage sequence   Skin Care- Lotion/Wrapping    Measurement/Fitting Patient fit for custom knee high Elvarex Cl 3F for R LE and order sent to [facility] -     R LE volumetrics taken.    NT - Fit assessment of new Farrow lower leg wrap for R LE with garment donned following MLD.    NT - Fit assess of Farrow basic leg wrap XL - garment is of good fit to L LE.  Ankle/foot wrap is too big (sent a XL long) and will request exchange.      NT - Trialed Tubigrip size G to L lower leg/foot as interim compression post MLD while waiting on garment.     Circaid reduction kit at ; insurance will only cover Farrow wrap so ordered via [facility].   Skin Stretching/Mobilization for Cording      GROUP  CPT 30839 TOTAL TIME FOR SESSION Not performed

## 2022-10-18 ENCOUNTER — HOSPITAL ENCOUNTER (OUTPATIENT)
Dept: PHYSICAL THERAPY | Age: 42
Setting detail: THERAPIES SERIES
Discharge: HOME | End: 2022-10-18
Attending: PODIATRIST
Payer: COMMERCIAL

## 2022-10-18 DIAGNOSIS — I89.0 LYMPHEDEMA: Primary | ICD-10-CM

## 2022-10-18 PROCEDURE — 97110 THERAPEUTIC EXERCISES: CPT | Mod: GP,U8

## 2022-10-18 PROCEDURE — 97140 MANUAL THERAPY 1/> REGIONS: CPT | Mod: GP,U8

## 2022-10-18 NOTE — PROGRESS NOTES
PT DAILY NOTE FOR OUTPATIENT THERAPY    Patient: Ramirez Montero MRN: [id number]  : 1980 41 y.o.  Referring Physician: Sanya Juarez Jr., *  Date of Visit: 10/18/2022    Certification Dates: 22 through 10/27/22    Diagnosis:   1. Lymphedema        Chief Complaints:  B LE lymphedema    Precautions:   Precautions comments: Hx of DM  Existing Precautions/Restrictions: no known precautions/restrictions     TODAY'S VISIT    Time In Session:  Start Time: 1600  Stop Time: 1659  Time Calculation (min): 59 min   History/Vitals/Pain/Encounter Info - 10/18/22 1603        Injury History/Precautions/Daily Required Info    Document Type daily treatment     Primary Therapist Jacqui Powell, PT, MSPT, CLT - [facility]     Chief Complaint/Reason for Visit  B LE lymphedema     Onset of Illness/Injury or Date of Surgery 22     Referring Physician Dr. Juarez, DPM     Existing Precautions/Restrictions no known precautions/restrictions     Precautions comments Hx of DM     History of present illness/functional impairment Patient presents with c/o B LE edema that has become more evident since his gastric bypass surgery in 2022.  He has since lost 100 lbs.  He recently saw his podiatrist who recommend consultation with a CLT.  Patient reports his edema has been there for a few years and is isolated to below the knees.  He states that his edema is effecting his work (works for [employer] long shifts) as his legs get hard and painful when he stands for a long time.  Tushar does state that his legs look best first thing in the morning or when he has a day off and can elevate his legs.  He has never used compression socks to date.     Patient/Family/Caregiver Comments/Observations No new reports. New Elvarex garments have not yet arrived.     Patient reported fall since last visit No        Pain Assessment    Currently in pain Yes     Preferred Pain Scale number (Numeric Rating Pain Scale)     Pain Side/Orientation bilateral      Pain: Body location Leg     Pain Rating (0-10): Pre Activity 0     Pain Rating (0-10): Activity 2     Pain Rating (0-10): Post Activity 0     Pain Description intermittent;sore        Pain Intervention    Intervention  MLD, compression     Post Intervention Comments See assessment                Daily Treatment Assessment and Plan - 10/18/22 1603        Daily Treatment Assessment and Plan    Progress toward goals Progressing     Daily Outcome Summary Patient with well maintained R LE volume reduction and further reduction of L LE in past week.  We are still waiting on Elvarex garments to arrive for fit assessment and modifications as needed.  Patient otherwise progressing well to date with CDT.     Plan and Recommendations Await Elvarex garments; continue with CDT and Farrow use in interim.                     OBJECTIVE DATA TAKEN TODAY:    Lymphedema     Lymphedema Assessment - 10/18/22 1600        Affected LE Measurements    Affected limb laterality Right     MTP 26 cm     -8 cm 28.1 cm     0 cm 34.2 cm     4 cm 45.8 cm     8 cm 50.5 cm     12 cm 56.5 cm     16 cm 59 cm     20 cm 62.4 cm     24 cm 67.7 cm     28 cm 67.5 cm     32 cm 60 cm     Affected LE Total Volume (ml) 56775.7 ml        Other Affected LE Circumference    Unaffected limb laterality Left     MTP 26.6 cm     -8 cm 28.8 cm     0 cm 33.7 cm     4 cm 48.8 cm     8 cm 56 cm     12 cm 59.6 cm     16 cm 66.6 cm     20 cm 69.5 cm     24 cm 72.3 cm     28 cm 75.5 cm     32 cm 64.4 cm     Other Affected LE Total Volume (ml) 996136.14 ml                 Today's Treatment:    ..LYMPHEDEMA PT FLOWSHEET    PT Lymph Exercises Current Session Time   MODALITIES TOTAL TIME FOR SESSION Not performed   Heat (CPT 17957)    Vasocompression/Ice (CPT 19249)3    Estim/H-Wave (CPT 29228)    Mechanical Traction (CPT 18991)    THER ACT  CPT 87455 TOTAL TIME FOR SESSION Not performed   Bed Mobility    Transfer Training    Body Mechanics/Work Training    Patient Education NT  - Patient edu to initiate Farrow use at night in addition to daytime use as tolerated.    NT - Patient education on Farrow wrap wear schedule starting with 2-3 hours during the day.  Provided with additional cotton stockinette base layers as Farrow stockinette a little snug at the top and at ankle crease.    NT - Patient education on compression options including pros and cons: compression bandaging versus Circaid reduction kit for treatment phase.  Patient elects to trial reduction kit and start with L LE.   THER EX  CPT 00050 TOTAL TIME FOR SESSION 8-22 Minutes   CARDIOVASCULAR    Nu Step L3 x 15 minutes following manual therapy with compression donned to B LEs   UBE    STRENGTHENING     Supine Ther-Ex    Standing Ther-Ex    Seated Ther-Ex    STRETCHING    Core Stabilization    LE Stretching    UE Stretching    Spinal Stretching    Postural     REPEATED MOVEMENTS    NEURO RE-ED  CPT 52608 TOTAL TIME FOR SESSION Not performed   COORDINATION    POSTURAL RE-ED    PRE-GAIT ACTIVITIES    BALANCE TRAINING    Sitting Balance    Standing Balance    KINESIOTAPE    GAIT TRAINING  CPT 82395 TOTAL TIME FOR SESSION Not performed   Ambulation     Dynamic Gait    MANUAL   CPT 67858 TOTAL TIME FOR SESSION 38-52 Minutes   Stretching    Mobilization    Massage- Deep Tissue, Scar, Transverse Friction    Manual Lymph Drainage MLD R LE sequence starting with groin prep and full R LE drainage sequence   Skin Care- Lotion/Wrapping    Measurement/Fitting Patient fit for custom knee high Elvarex Cl 3F for R LE and order sent to [address]    B LE volumetrics taken.    NT - Fit assessment of new Farrow lower leg wrap for R LE with garment donned following MLD.    NT - Fit assess of Farrow basic leg wrap XL - garment is of good fit to L LE.  Ankle/foot wrap is too big (sent a XL long) and will request exchange.      NT - Trialed Tubigrip size G to L lower leg/foot as interim compression post MLD while waiting on garment.     Circaid reduction  kit at ; insurance will only cover Farrow wrap so ordered via [facility].   Skin Stretching/Mobilization for Cording      GROUP  CPT 12177 TOTAL TIME FOR SESSION Not performed

## 2022-10-18 NOTE — OP PT TREATMENT LOG
..LYMPHEDEMA PT FLOWSHEET    PT Lymph Exercises Current Session Time   MODALITIES TOTAL TIME FOR SESSION Not performed   Heat (CPT 50692)    Vasocompression/Ice (CPT 27231)3    Estim/H-Wave (CPT 68579)    Mechanical Traction (CPT 29257)    THER ACT  CPT 72404 TOTAL TIME FOR SESSION Not performed   Bed Mobility    Transfer Training    Body Mechanics/Work Training    Patient Education NT - Patient edu to initiate Farrow use at night in addition to daytime use as tolerated.    NT - Patient education on Farrow wrap wear schedule starting with 2-3 hours during the day.  Provided with additional cotton stockinette base layers as Farrow stockinette a little snug at the top and at ankle crease.    NT - Patient education on compression options including pros and cons: compression bandaging versus Circaid reduction kit for treatment phase.  Patient elects to trial reduction kit and start with L LE.   THER EX  CPT 59151 TOTAL TIME FOR SESSION 8-22 Minutes   CARDIOVASCULAR    Nu Step L3 x 15 minutes following manual therapy with compression donned to B LEs   UBE    STRENGTHENING     Supine Ther-Ex    Standing Ther-Ex    Seated Ther-Ex    STRETCHING    Core Stabilization    LE Stretching    UE Stretching    Spinal Stretching    Postural     REPEATED MOVEMENTS    NEURO RE-ED  CPT 36724 TOTAL TIME FOR SESSION Not performed   COORDINATION    POSTURAL RE-ED    PRE-GAIT ACTIVITIES    BALANCE TRAINING    Sitting Balance    Standing Balance    KINESIOTAPE    GAIT TRAINING  CPT 74122 TOTAL TIME FOR SESSION Not performed   Ambulation     Dynamic Gait    MANUAL   CPT 89878 TOTAL TIME FOR SESSION 38-52 Minutes   Stretching    Mobilization    Massage- Deep Tissue, Scar, Transverse Friction    Manual Lymph Drainage MLD R LE sequence starting with groin prep and full R LE drainage sequence   Skin Care- Lotion/Wrapping    Measurement/Fitting Patient fit for custom knee high Elvarex Cl 3F for R LE and order sent to [address]    B LE volumetrics  taken.    NT - Fit assessment of new Farrow lower leg wrap for R LE with garment donned following MLD.    NT - Fit assess of Farrow basic leg wrap XL - garment is of good fit to L LE.  Ankle/foot wrap is too big (sent a XL long) and will request exchange.      NT - Trialed Tubigrip size G to L lower leg/foot as interim compression post MLD while waiting on garment.     Circaid reduction kit at ; insurance will only cover Farrow wrap so ordered via [facility].   Skin Stretching/Mobilization for Cording      GROUP  CPT 23420 TOTAL TIME FOR SESSION Not performed

## 2022-10-25 ENCOUNTER — RX ONLY (OUTPATIENT)
Age: 42
Setting detail: RX ONLY
End: 2022-10-25

## 2022-10-25 ENCOUNTER — HOSPITAL ENCOUNTER (OUTPATIENT)
Dept: PHYSICAL THERAPY | Age: 42
Setting detail: THERAPIES SERIES
Discharge: HOME | End: 2022-10-25
Attending: PODIATRIST
Payer: COMMERCIAL

## 2022-10-25 DIAGNOSIS — I89.0 LYMPHEDEMA: Primary | ICD-10-CM

## 2022-10-25 PROBLEM — G47.33 OSA (OBSTRUCTIVE SLEEP APNEA): Status: ACTIVE | Noted: 2022-04-01

## 2022-10-25 PROBLEM — M54.50 CHRONIC LEFT-SIDED LOW BACK PAIN WITHOUT SCIATICA: Status: ACTIVE | Noted: 2022-07-25

## 2022-10-25 PROBLEM — M13.0 POLYARTHROPATHY: Status: ACTIVE | Noted: 2022-07-25

## 2022-10-25 PROBLEM — G89.29 CHRONIC LEFT-SIDED LOW BACK PAIN WITHOUT SCIATICA: Status: ACTIVE | Noted: 2022-07-25

## 2022-10-25 PROBLEM — I10 HYPERTENSION: Status: ACTIVE | Noted: 2022-04-01

## 2022-10-25 PROBLEM — E78.2 MIXED HYPERLIPIDEMIA: Status: ACTIVE | Noted: 2022-04-01

## 2022-10-25 PROCEDURE — 97140 MANUAL THERAPY 1/> REGIONS: CPT | Mod: GP,U8

## 2022-10-25 RX ORDER — PIMECROLIMUS 10 MG/G
1 CREAM TOPICAL BID
Qty: 60 | Refills: 3 | Status: ERX | COMMUNITY
Start: 2022-10-25

## 2022-10-25 RX ORDER — AMMONIUM LACTATE 12 G/100G
1 LOTION TOPICAL BID
Qty: 400 | Refills: 3 | Status: ERX

## 2022-10-25 RX ORDER — TACROLIMUS 1 MG/G
1 OINTMENT TOPICAL QDAY
Qty: 60 | Refills: 3 | Status: ERX | COMMUNITY
Start: 2022-10-25

## 2022-10-25 NOTE — Clinical Note
[address]  [facility] [address]  {[facility] Plan of Care Fax List:[phone]}    PHYSICAL THERAPY PLAN OF CARE    Patient Name: Ramirez Montero    Certification Dates:  From 10/25/22  To: 22  Frequency: 2 times/week Duration: 4 weeks  Other: 1-2x/week for final garment fitting/assessments    Provider: Jacqui Powell, PT     Referring Provider: Sanya Juarez Jr., *  PCP: Argentina Carr MD        Payor: Payor: [employer] / Plan: [employer] / Product Type: [place] Managed Care /   Medical Diagnosis: Lymphedema [I89.0]     Rehab Potential: good, to achieve stated therapy goals    Planned Services: The patient's treatment will include  , .     By signing this plan of care, I certify this plan of care as correct and necessary for the patient.        Physician Signature: _________________________________________ Date: _________________    Thank you for this referral. Please contact our department with any questions.      Jacqui Powell PT      Referring Provider: By co-signing this Plan of Care (POC) either electronically or physically you agree to the following:    I have reviewed the the Plan of Care established by the therapist within this document and certify that the services are skilled and medically necessary. I have reviewed the plan and recommend that these services continue to meet the goals stated in this document.       EXTERNAL PROVIDER FAXING BACK:    PHYSICIAN SIGNATURE: __________________________________     DATE: ___________________    TIME: _________    IMPORTANT:  If returning this Plan of Care by fax, please fax back ONLY the signature page.   _____________________________________________________________________    [facility] Fax: [phone]      PT RE-EVALUATION FOR OUTPATIENT THERAPY    Patient: Ramirez Montero   MRN: [id number]  : 1980 41 y.o.  Referring Physician: Sanya Juarez Jr., *  Date of Visit: 10/25/2022      New Certification Dates:  10/25/22 through 11/22/22    Recommended Frequency & Duration:  2 times/week for up to 4 weeks   1-2x/week for final garment fitting/assessments      Diagnosis:   1. Lymphedema        Chief Complaints:  Chief Complaint   Patient presents with    Other     Edema       Precautions:   Precautions comments: Hx of DM  Existing Precautions/Restrictions: no known precautions/restrictions    TODAY'S VISIT:    Time In Session:  Start Time: 1500  Stop Time: 1557  Time Calculation (min): 57 min   General Information - 10/25/22 1500        Session Details    Document Type re-evaluation     Mode of Treatment individual therapy;physical therapy     Patient/Family/Caregiver Comments/Observations Patient presents with R and L Elvarex garments for fit assessment.  Patient had to go to [place] ER for chest pain; found to be muscular in nature so patient released to home.        General Information    Onset of Illness/Injury or Date of Surgery 03/03/22     Referring Physician Dr. Juarez, DPM     History of present illness/functional impairment Patient presents with c/o B LE edema that has become more evident since his gastric bypass surgery in March 2022.  He has since lost 100 lbs.  He recently saw his podiatrist who recommend consultation with a CLT.  Patient reports his edema has been there for a few years and is isolated to below the knees.  He states that his edema is effecting his work (works for [employer] long shifts) as his legs get hard and painful when he stands for a long time.  Tushar does state that his legs look best first thing in the morning or when he has a day off and can elevate his legs.  He has never used compression socks to date.     Existing Precautions/Restrictions no known precautions/restrictions     Precautions comments Hx of DM                  Pain/Vitals - 10/25/22 1500        Pain Assessment    Currently in pain No/Denies     Preferred Pain Scale number (Numeric Rating Pain Scale)     Pain Side/Orientation  bilateral     Pain Rating (0-10): Pre Activity 0        Pain Intervention    Intervention  Compression     Post Intervention Comments See assessment                PT - 10/25/22 1500        Physical Therapy    Physical Therapy Specialty Lymphedema Program        PT Plan    Frequency of treatment 2 times/week     PT Duration 4 weeks     PT Custom Frequency and Duration 1-2x/week for final garment fitting/assessments     PT Cert From 10/25/22     PT Cert To 11/22/22     Date PT POC was sent to provider 10/25/22     Signed PT Plan of Care received?  No   POC refaxed 9/27.               Assessment and Plan - 10/25/22 1504        Assessment    Plan of Care reviewed and patient/family in agreement Yes     System Pathology/Pathophysiology Noted musculoskeletal;integumentary     Functional Limitations in Following Categories (PT Eval) self-care;home management;work;community/leisure     Rehab Potential/Prognosis good, to achieve stated therapy goals     Problem List pain;other (see comments)     Clinical Assessment Patient has met majority of PT goals to date.  He received his Elvarex garments, however L LE garment needs to be returned for length modification.  Patient to start using R Elvarex daily as tolerated and duplicate garment ordered.  Patient to continue with use of Farrow wrap on R LE why waiting on modified Elvarex.  R LE volume slightly increased today although patient measured in sitting position today versus supine.  Recommend continued PT 1-2x/week to insure Elvarex garment comfort and efficacy and prep for DC to self care once both garments arrive and assessed for fit/efficacy.     Plan and Recommendations Await L LE Elvarex garment following modifications.  Patient to initiate daytime use of R Elvarex with second duplicate garment ordered.                   OBJECTIVE MEASUREMENTS/DATA:     Lymphedema     Lymphedema Assessment - 10/25/22 1500        Other Affected LE Circumference    Unaffected limb  laterality Left     MTP 26.6 cm     -8 cm 29.3 cm     0 cm 36.7 cm     4 cm 48.8 cm     8 cm 56.5 cm     12 cm 62 cm     16 cm 67.4 cm     20 cm 71.9 cm     24 cm 74 cm     28 cm 75.2 cm     32 cm 65.3 cm     Other Affected LE Total Volume (ml) 048019.32 ml                       Today's Treatment::    ..LYMPHEDEMA PT FLOWSHEET    PT Lymph Exercises Current Session Time   MODALITIES TOTAL TIME FOR SESSION Not performed   Heat (CPT 87802)    Vasocompression/Ice (CPT 15532)3    Estim/H-Wave (CPT 67985)    Mechanical Traction (CPT 62212)    THER ACT  CPT 61993 TOTAL TIME FOR SESSION Not performed   Bed Mobility    Transfer Training    Body Mechanics/Work Training    Patient Education NT - Patient edu to initiate Farrow use at night in addition to daytime use as tolerated.    NT - Patient education on Farrow wrap wear schedule starting with 2-3 hours during the day.  Provided with additional cotton stockinette base layers as Farrow stockinette a little snug at the top and at ankle crease.    NT - Patient education on compression options including pros and cons: compression bandaging versus Circaid reduction kit for treatment phase.  Patient elects to trial reduction kit and start with L LE.   THER EX  CPT 92418 TOTAL TIME FOR SESSION Not performed   CARDIOVASCULAR    Nu Step L3 x 15 minutes following manual therapy with compression donned to B LEs   UBE    STRENGTHENING     Supine Ther-Ex    Standing Ther-Ex    Seated Ther-Ex    STRETCHING    Core Stabilization    LE Stretching    UE Stretching    Spinal Stretching    Postural     REPEATED MOVEMENTS    NEURO RE-ED  CPT 60419 TOTAL TIME FOR SESSION Not performed   COORDINATION    POSTURAL RE-ED    PRE-GAIT ACTIVITIES    BALANCE TRAINING    Sitting Balance    Standing Balance    KINESIOTAPE    GAIT TRAINING  CPT 00851 TOTAL TIME FOR SESSION Not performed   Ambulation     Dynamic Gait    MANUAL   CPT 33389 TOTAL TIME FOR SESSION 53-67 Minutes   Stretching    Mobilization     Massage- Deep Tissue, Scar, Transverse Friction    Manual Lymph Drainage MLD L LE sequence starting with groin prep and full L LE drainage sequence   Skin Care- Lotion/Wrapping    Measurement/Fitting     Patient fit check for custom knee high Elvarex Cl 3F for R LE and L LE - R LE is of good fit, L LE will need length modification - add 6 cm to top of garment with circumference of 73 cm.    L LE volumetrics taken.    NT - Fit assessment of new Farrow lower leg wrap for R LE with garment donned following MLD.    NT - Fit assess of Farrow basic leg wrap XL - garment is of good fit to L LE.  Ankle/foot wrap is too big (sent a XL long) and will request exchange.      NT - Trialed Tubigrip size G to L lower leg/foot as interim compression post MLD while waiting on garment.     Circaid reduction kit at ; insurance will only cover Farrow wrap so ordered via [facility].   Skin Stretching/Mobilization for Cording      GROUP  CPT 17284 TOTAL TIME FOR SESSION Not performed                    Goals:   Goals      PT Lymph Goals      Patient will be able to teach back 3 skin care practices for infection reduction risk. Short Term 3 weeks Met   Patient will be able to tolerate L LE compression as recommended for 10 hours + a day. Short Term 3 weeks Met   Patient will demonstrate a L LE volume reduction of at least 10 %. Short Term 4 weeks Met   Patient will have reduced R LE volume by 5% or more Short Term 6 weeks Met   Patient will be Ind with skin care practices in order to reduce infection risk of B LE and avoid hospitalizations for cellulits. Long Term 8 weeks Met   Patient will be Ind with use of appropriate compression strategies to maintain B LE limb status and avoid progression of lymphedema to next stage. Long Term 8 weeks Partially met   Patient will have improved LLIS score of 20% impairment or better. Long Term 8 weeks                  This 41 y.o. year old male presents to PT with above stated diagnosis. Physical  Therapy evaluation reveals pain, other (see comments) resulting in self-care, home management, work, community/leisure limitations. Ramirez Montero will benefit from skilled PT services to address limitation, work towards rehab and patient goals and maximize PLOF of chosen ADLs.     Planned Services: The patient's treatment will include  , .

## 2022-10-25 NOTE — OP PT TREATMENT LOG
..LYMPHEDEMA PT FLOWSHEET    PT Lymph Exercises Current Session Time   MODALITIES TOTAL TIME FOR SESSION Not performed   Heat (CPT 94654)    Vasocompression/Ice (CPT 06059)3    Estim/H-Wave (CPT 91547)    Mechanical Traction (CPT 17118)    THER ACT  CPT 87348 TOTAL TIME FOR SESSION Not performed   Bed Mobility    Transfer Training    Body Mechanics/Work Training    Patient Education NT - Patient edu to initiate Farrow use at night in addition to daytime use as tolerated.    NT - Patient education on Farrow wrap wear schedule starting with 2-3 hours during the day.  Provided with additional cotton stockinette base layers as Farrow stockinette a little snug at the top and at ankle crease.    NT - Patient education on compression options including pros and cons: compression bandaging versus Circaid reduction kit for treatment phase.  Patient elects to trial reduction kit and start with L LE.   THER EX  CPT 11292 TOTAL TIME FOR SESSION Not performed   CARDIOVASCULAR    Nu Step L3 x 15 minutes following manual therapy with compression donned to B LEs   UBE    STRENGTHENING     Supine Ther-Ex    Standing Ther-Ex    Seated Ther-Ex    STRETCHING    Core Stabilization    LE Stretching    UE Stretching    Spinal Stretching    Postural     REPEATED MOVEMENTS    NEURO RE-ED  CPT 26586 TOTAL TIME FOR SESSION Not performed   COORDINATION    POSTURAL RE-ED    PRE-GAIT ACTIVITIES    BALANCE TRAINING    Sitting Balance    Standing Balance    KINESIOTAPE    GAIT TRAINING  CPT 28310 TOTAL TIME FOR SESSION Not performed   Ambulation     Dynamic Gait    MANUAL   CPT 75875 TOTAL TIME FOR SESSION 53-67 Minutes   Stretching    Mobilization    Massage- Deep Tissue, Scar, Transverse Friction    Manual Lymph Drainage MLD L LE sequence starting with groin prep and full L LE drainage sequence   Skin Care- Lotion/Wrapping    Measurement/Fitting     Patient fit check for custom knee high Elvarex Cl 3F for R LE and L LE - R LE is of good fit, L LE  will need length modification - add 6 cm to top of garment with circumference of 73 cm.    L LE volumetrics taken.    NT - Fit assessment of new Farrow lower leg wrap for R LE with garment donned following MLD.    NT - Fit assess of Farrow basic leg wrap XL - garment is of good fit to L LE.  Ankle/foot wrap is too big (sent a XL long) and will request exchange.      NT - Trialed Tubigrip size G to L lower leg/foot as interim compression post MLD while waiting on garment.     Circaid reduction kit at ; insurance will only cover Farrow wrap so ordered via [facility].   Skin Stretching/Mobilization for Cording      GROUP  CPT 01673 TOTAL TIME FOR SESSION Not performed

## 2022-10-25 NOTE — PROGRESS NOTES
Referring Provider: By co-signing this Plan of Care (POC) either electronically or physically you agree to the following:    I have reviewed the the Plan of Care established by the therapist within this document and certify that the services are skilled and medically necessary. I have reviewed the plan and recommend that these services continue to meet the goals stated in this document.       EXTERNAL PROVIDER FAXING BACK:    PHYSICIAN SIGNATURE: __________________________________     DATE: ___________________    TIME: _________    IMPORTANT:  If returning this Plan of Care by fax, please fax back ONLY the signature page.   _____________________________________________________________________    [facility] Fax: [phone]      PT RE-EVALUATION FOR OUTPATIENT THERAPY    Patient: Ramirez Montero   MRN: [id number]  : 1980 41 y.o.  Referring Physician: Sanya Juarez Jr., *  Date of Visit: 10/25/2022      New Certification Dates: 10/25/22 through 22    Recommended Frequency & Duration:  2 times/week for up to 4 weeks   1-2x/week for final garment fitting/assessments      Diagnosis:   1. Lymphedema        Chief Complaints:  Chief Complaint   Patient presents with    Other     Edema       Precautions:   Precautions comments: Hx of DM  Existing Precautions/Restrictions: no known precautions/restrictions    TODAY'S VISIT:    Time In Session:  Start Time: 1500  Stop Time: 1557  Time Calculation (min): 57 min   General Information - 10/25/22 1500        Session Details    Document Type re-evaluation     Mode of Treatment individual therapy;physical therapy     Patient/Family/Caregiver Comments/Observations Patient presents with R and L Elvarex garments for fit assessment.  Patient had to go to [place] ER for chest pain; found to be muscular in nature so patient released to home.        General Information    Onset of Illness/Injury or Date of Surgery 22     Referring Physician Dr. Juarez,  DPM     History of present illness/functional impairment Patient presents with c/o B LE edema that has become more evident since his gastric bypass surgery in March 2022.  He has since lost 100 lbs.  He recently saw his podiatrist who recommend consultation with a CLT.  Patient reports his edema has been there for a few years and is isolated to below the knees.  He states that his edema is effecting his work (works for [employer] long shifts) as his legs get hard and painful when he stands for a long time.  Tushar does state that his legs look best first thing in the morning or when he has a day off and can elevate his legs.  He has never used compression socks to date.     Existing Precautions/Restrictions no known precautions/restrictions     Precautions comments Hx of DM                  Pain/Vitals - 10/25/22 1500        Pain Assessment    Currently in pain No/Denies     Preferred Pain Scale number (Numeric Rating Pain Scale)     Pain Side/Orientation bilateral     Pain Rating (0-10): Pre Activity 0        Pain Intervention    Intervention  Compression     Post Intervention Comments See assessment                PT - 10/25/22 1500        Physical Therapy    Physical Therapy Specialty Lymphedema Program        PT Plan    Frequency of treatment 2 times/week     PT Duration 4 weeks     PT Custom Frequency and Duration 1-2x/week for final garment fitting/assessments     PT Cert From 10/25/22     PT Cert To 11/22/22     Date PT POC was sent to provider 10/25/22     Signed PT Plan of Care received?  No   POC refaxed 9/27.               Assessment and Plan - 10/25/22 1504        Assessment    Plan of Care reviewed and patient/family in agreement Yes     System Pathology/Pathophysiology Noted musculoskeletal;integumentary     Functional Limitations in Following Categories (PT Eval) self-care;home management;work;community/leisure     Rehab Potential/Prognosis good, to achieve stated therapy goals     Problem List pain;other  (see comments)     Clinical Assessment Patient has met majority of PT goals to date.  He received his Elvarex garments, however L LE garment needs to be returned for length modification.  Patient to start using R Elvarex daily as tolerated and duplicate garment ordered.  Patient to continue with use of Farrow wrap on R LE why waiting on modified Elvarex.  R LE volume slightly increased today although patient measured in sitting position today versus supine.  Recommend continued PT 1-2x/week to insure Elvarex garment comfort and efficacy and prep for DC to self care once both garments arrive and assessed for fit/efficacy.     Plan and Recommendations Await L LE Elvarex garment following modifications.  Patient to initiate daytime use of R Elvarex with second duplicate garment ordered.                   OBJECTIVE MEASUREMENTS/DATA:     Lymphedema     Lymphedema Assessment - 10/25/22 1500        Other Affected LE Circumference    Unaffected limb laterality Left     MTP 26.6 cm     -8 cm 29.3 cm     0 cm 36.7 cm     4 cm 48.8 cm     8 cm 56.5 cm     12 cm 62 cm     16 cm 67.4 cm     20 cm 71.9 cm     24 cm 74 cm     28 cm 75.2 cm     32 cm 65.3 cm     Other Affected LE Total Volume (ml) 630996.32 ml                       Today's Treatment::    ..LYMPHEDEMA PT FLOWSHEET    PT Lymph Exercises Current Session Time   MODALITIES TOTAL TIME FOR SESSION Not performed   Heat (CPT 24136)    Vasocompression/Ice (CPT 82217)3    Estim/H-Wave (CPT 87144)    Mechanical Traction (CPT 48698)    THER ACT  CPT 78165 TOTAL TIME FOR SESSION Not performed   Bed Mobility    Transfer Training    Body Mechanics/Work Training    Patient Education NT - Patient edu to initiate Farrow use at night in addition to daytime use as tolerated.    NT - Patient education on Farrow wrap wear schedule starting with 2-3 hours during the day.  Provided with additional cotton stockinette base layers as Farrow stockinette a little snug at the top and at ankle  crease.    NT - Patient education on compression options including pros and cons: compression bandaging versus Circaid reduction kit for treatment phase.  Patient elects to trial reduction kit and start with L LE.   THER EX  CPT 02248 TOTAL TIME FOR SESSION Not performed   CARDIOVASCULAR    Nu Step L3 x 15 minutes following manual therapy with compression donned to B LEs   UBE    STRENGTHENING     Supine Ther-Ex    Standing Ther-Ex    Seated Ther-Ex    STRETCHING    Core Stabilization    LE Stretching    UE Stretching    Spinal Stretching    Postural     REPEATED MOVEMENTS    NEURO RE-ED  CPT 69063 TOTAL TIME FOR SESSION Not performed   COORDINATION    POSTURAL RE-ED    PRE-GAIT ACTIVITIES    BALANCE TRAINING    Sitting Balance    Standing Balance    KINESIOTAPE    GAIT TRAINING  CPT 33949 TOTAL TIME FOR SESSION Not performed   Ambulation     Dynamic Gait    MANUAL   CPT 32162 TOTAL TIME FOR SESSION 53-67 Minutes   Stretching    Mobilization    Massage- Deep Tissue, Scar, Transverse Friction    Manual Lymph Drainage MLD L LE sequence starting with groin prep and full L LE drainage sequence   Skin Care- Lotion/Wrapping    Measurement/Fitting     Patient fit check for custom knee high Elvarex Cl 3F for R LE and L LE - R LE is of good fit, L LE will need length modification - add 6 cm to top of garment with circumference of 73 cm.    L LE volumetrics taken.    NT - Fit assessment of new Farrow lower leg wrap for R LE with garment donned following MLD.    NT - Fit assess of Farrow basic leg wrap XL - garment is of good fit to L LE.  Ankle/foot wrap is too big (sent a XL long) and will request exchange.      NT - Trialed Tubigrip size G to L lower leg/foot as interim compression post MLD while waiting on garment.     Circaid reduction kit at ; insurance will only cover Farrow wrap so ordered via [facility].   Skin Stretching/Mobilization for Cording      GROUP  CPT 10509 TOTAL TIME FOR SESSION Not performed                     Goals:   Goals      PT Lymph Goals      Patient will be able to teach back 3 skin care practices for infection reduction risk. Short Term 3 weeks Met   Patient will be able to tolerate L LE compression as recommended for 10 hours + a day. Short Term 3 weeks Met   Patient will demonstrate a L LE volume reduction of at least 10 %. Short Term 4 weeks Met   Patient will have reduced R LE volume by 5% or more Short Term 6 weeks Met   Patient will be Ind with skin care practices in order to reduce infection risk of B LE and avoid hospitalizations for cellulits. Long Term 8 weeks Met   Patient will be Ind with use of appropriate compression strategies to maintain B LE limb status and avoid progression of lymphedema to next stage. Long Term 8 weeks Partially met   Patient will have improved LLIS score of 20% impairment or better. Long Term 8 weeks                  This 41 y.o. year old male presents to PT with above stated diagnosis. Physical Therapy evaluation reveals pain, other (see comments) resulting in self-care, home management, work, community/leisure limitations. Ramirez Montero will benefit from skilled PT services to address limitation, work towards rehab and patient goals and maximize PLOF of chosen ADLs.     Planned Services: The patient's treatment will include  , .

## 2022-10-25 NOTE — LETTER
[address]  [place] [facility] [place] [address]  [facility] Fax: [phone]    PHYSICAL THERAPY PLAN OF CARE    Patient Name: Ramirez Montero    Certification Dates:  From 10/25/22  To: 22  Frequency: 2 times/week Duration: 4 weeks  Other: 1-2x/week for final garment fitting/assessments    Provider: Jacqui Powell, PT     Referring Provider: Sanya Juarez Jr., *  PCP: Argentina Carr MD        Payor: Payor: [employer] / Plan: [employer] / Product Type: MA Managed Care /   Medical Diagnosis: Lymphedema [I89.0]     Rehab Potential: good, to achieve stated therapy goals    Planned Services: The patient's treatment will include  , .     By signing this plan of care, I certify this plan of care as correct and necessary for the patient.        Physician Signature: _________________________________________ Date: _________________    Thank you for this referral. Please contact our department with any questions.      Jacqui Powell PT      Referring Provider: By co-signing this Plan of Care (POC) either electronically or physically you agree to the following:    I have reviewed the the Plan of Care established by the therapist within this document and certify that the services are skilled and medically necessary. I have reviewed the plan and recommend that these services continue to meet the goals stated in this document.       EXTERNAL PROVIDER FAXING BACK:    PHYSICIAN SIGNATURE: __________________________________     DATE: ___________________    TIME: _________    IMPORTANT:  If returning this Plan of Care by fax, please fax back ONLY the signature page.   _____________________________________________________________________    [facility] Fax: [phone]      PT RE-EVALUATION FOR OUTPATIENT THERAPY    Patient: Ramirez Montero   MRN: [id number]  : 1980 41 y.o.  Referring Physician: Sanya Juarez Jr., *  Date of Visit: 10/25/2022      New Certification Dates: 10/25/22  through 11/22/22    Recommended Frequency & Duration:  2 times/week for up to 4 weeks   1-2x/week for final garment fitting/assessments      Diagnosis:   1. Lymphedema        Chief Complaints:  Chief Complaint   Patient presents with    Other     Edema       Precautions:   Precautions comments: Hx of DM  Existing Precautions/Restrictions: no known precautions/restrictions    TODAY'S VISIT:    Time In Session:  Start Time: 1500  Stop Time: 1557  Time Calculation (min): 57 min   General Information - 10/25/22 1500        Session Details    Document Type re-evaluation     Mode of Treatment individual therapy;physical therapy     Patient/Family/Caregiver Comments/Observations Patient presents with R and L Elvarex garments for fit assessment.  Patient had to go to [facility] for chest pain; found to be muscular in nature so patient released to home.        General Information    Onset of Illness/Injury or Date of Surgery 03/03/22     Referring Physician Dr. Juarez, DPM     History of present illness/functional impairment Patient presents with c/o B LE edema that has become more evident since his gastric bypass surgery in March 2022.  He has since lost 100 lbs.  He recently saw his podiatrist who recommend consultation with a CLT.  Patient reports his edema has been there for a few years and is isolated to below the knees.  He states that his edema is effecting his work (works for [employer] long shifts) as his legs get hard and painful when he stands for a long time.  Tushar does state that his legs look best first thing in the morning or when he has a day off and can elevate his legs.  He has never used compression socks to date.     Existing Precautions/Restrictions no known precautions/restrictions     Precautions comments Hx of DM                  Pain/Vitals - 10/25/22 1500        Pain Assessment    Currently in pain No/Denies     Preferred Pain Scale number (Numeric Rating Pain Scale)     Pain Side/Orientation  bilateral     Pain Rating (0-10): Pre Activity 0        Pain Intervention    Intervention  Compression     Post Intervention Comments See assessment                PT - 10/25/22 1500        Physical Therapy    Physical Therapy Specialty Lymphedema Program        PT Plan    Frequency of treatment 2 times/week     PT Duration 4 weeks     PT Custom Frequency and Duration 1-2x/week for final garment fitting/assessments     PT Cert From 10/25/22     PT Cert To 11/22/22     Date PT POC was sent to provider 10/25/22     Signed PT Plan of Care received?  No   POC refaxed 9/27.               Assessment and Plan - 10/25/22 1504        Assessment    Plan of Care reviewed and patient/family in agreement Yes     System Pathology/Pathophysiology Noted musculoskeletal;integumentary     Functional Limitations in Following Categories (PT Eval) self-care;home management;work;community/leisure     Rehab Potential/Prognosis good, to achieve stated therapy goals     Problem List pain;other (see comments)     Clinical Assessment Patient has met majority of PT goals to date.  He received his Elvarex garments, however L LE garment needs to be returned for length modification.  Patient to start using R Elvarex daily as tolerated and duplicate garment ordered.  Patient to continue with use of Farrow wrap on R LE why waiting on modified Elvarex.  R LE volume slightly increased today although patient measured in sitting position today versus supine.  Recommend continued PT 1-2x/week to insure Elvarex garment comfort and efficacy and prep for DC to self care once both garments arrive and assessed for fit/efficacy.     Plan and Recommendations Await L LE Elvarex garment following modifications.  Patient to initiate daytime use of R Elvarex with second duplicate garment ordered.                   OBJECTIVE MEASUREMENTS/DATA:     Lymphedema     Lymphedema Assessment - 10/25/22 1500        Other Affected LE Circumference    Unaffected limb  laterality Left     MTP 26.6 cm     -8 cm 29.3 cm     0 cm 36.7 cm     4 cm 48.8 cm     8 cm 56.5 cm     12 cm 62 cm     16 cm 67.4 cm     20 cm 71.9 cm     24 cm 74 cm     28 cm 75.2 cm     32 cm 65.3 cm     Other Affected LE Total Volume (ml) 299180.32 ml                       Today's Treatment::    ..LYMPHEDEMA PT FLOWSHEET    PT Lymph Exercises Current Session Time   MODALITIES TOTAL TIME FOR SESSION Not performed   Heat (CPT 38800)    Vasocompression/Ice (CPT 41226)3    Estim/H-Wave (CPT 94275)    Mechanical Traction (CPT 93003)    THER ACT  CPT 48757 TOTAL TIME FOR SESSION Not performed   Bed Mobility    Transfer Training    Body Mechanics/Work Training    Patient Education NT - Patient edu to initiate Farrow use at night in addition to daytime use as tolerated.    NT - Patient education on Farrow wrap wear schedule starting with 2-3 hours during the day.  Provided with additional cotton stockinette base layers as Farrow stockinette a little snug at the top and at ankle crease.    NT - Patient education on compression options including pros and cons: compression bandaging versus Circaid reduction kit for treatment phase.  Patient elects to trial reduction kit and start with L LE.   THER EX  CPT 77502 TOTAL TIME FOR SESSION Not performed   CARDIOVASCULAR    Nu Step L3 x 15 minutes following manual therapy with compression donned to B LEs   UBE    STRENGTHENING     Supine Ther-Ex    Standing Ther-Ex    Seated Ther-Ex    STRETCHING    Core Stabilization    LE Stretching    UE Stretching    Spinal Stretching    Postural     REPEATED MOVEMENTS    NEURO RE-ED  CPT 66532 TOTAL TIME FOR SESSION Not performed   COORDINATION    POSTURAL RE-ED    PRE-GAIT ACTIVITIES    BALANCE TRAINING    Sitting Balance    Standing Balance    KINESIOTAPE    GAIT TRAINING  CPT 73266 TOTAL TIME FOR SESSION Not performed   Ambulation     Dynamic Gait    MANUAL   CPT 07867 TOTAL TIME FOR SESSION 53-67 Minutes   Stretching    Mobilization     Massage- Deep Tissue, Scar, Transverse Friction    Manual Lymph Drainage MLD L LE sequence starting with groin prep and full L LE drainage sequence   Skin Care- Lotion/Wrapping    Measurement/Fitting     Patient fit check for custom knee high Elvarex Cl 3F for R LE and L LE - R LE is of good fit, L LE will need length modification - add 6 cm to top of garment with circumference of 73 cm.    L LE volumetrics taken.    NT - Fit assessment of new Farrow lower leg wrap for R LE with garment donned following MLD.    NT - Fit assess of Farrow basic leg wrap XL - garment is of good fit to L LE.  Ankle/foot wrap is too big (sent a XL long) and will request exchange.      NT - Trialed Tubigrip size G to L lower leg/foot as interim compression post MLD while waiting on garment.     Circaid reduction kit at ; insurance will only cover Farrow wrap so ordered via [facility].   Skin Stretching/Mobilization for Cording      GROUP  CPT 68329 TOTAL TIME FOR SESSION Not performed                    Goals:   Goals      PT Lymph Goals      Patient will be able to teach back 3 skin care practices for infection reduction risk. Short Term 3 weeks Met   Patient will be able to tolerate L LE compression as recommended for 10 hours + a day. Short Term 3 weeks Met   Patient will demonstrate a L LE volume reduction of at least 10 %. Short Term 4 weeks Met   Patient will have reduced R LE volume by 5% or more Short Term 6 weeks Met   Patient will be Ind with skin care practices in order to reduce infection risk of B LE and avoid hospitalizations for cellulits. Long Term 8 weeks Met   Patient will be Ind with use of appropriate compression strategies to maintain B LE limb status and avoid progression of lymphedema to next stage. Long Term 8 weeks Partially met   Patient will have improved LLIS score of 20% impairment or better. Long Term 8 weeks                  This 41 y.o. year old male presents to PT with above stated diagnosis. Physical  Therapy evaluation reveals pain, other (see comments) resulting in self-care, home management, work, community/leisure limitations. Ramirez Montero will benefit from skilled PT services to address limitation, work towards rehab and patient goals and maximize PLOF of chosen ADLs.     Planned Services: The patient's treatment will include  , .

## 2022-11-01 ENCOUNTER — HOSPITAL ENCOUNTER (OUTPATIENT)
Dept: PHYSICAL THERAPY | Age: 42
Setting detail: THERAPIES SERIES
Discharge: HOME | End: 2022-11-01
Attending: PODIATRIST
Payer: COMMERCIAL

## 2022-11-01 DIAGNOSIS — I89.0 LYMPHEDEMA: Primary | ICD-10-CM

## 2022-11-01 PROCEDURE — 97110 THERAPEUTIC EXERCISES: CPT | Mod: GP,U8

## 2022-11-01 PROCEDURE — 97140 MANUAL THERAPY 1/> REGIONS: CPT | Mod: GP,U8

## 2022-11-01 NOTE — Clinical Note
[address]  [facility] [place] [address]      Dear DR. Juarez,      Thank you for this referral. Please review the attached notes and plan of care for your approval.  Please contact our department with any questions.     Sincerely,     Jacqui Powell, PT    No notes on file

## 2022-11-01 NOTE — OP PT TREATMENT LOG
..LYMPHEDEMA PT FLOWSHEET    PT Lymph Exercises Current Session Time   MODALITIES TOTAL TIME FOR SESSION Not performed   Heat (CPT 39198)    Vasocompression/Ice (CPT 14968)3    Estim/H-Wave (CPT 20164)    Mechanical Traction (CPT 65083)    THER ACT  CPT 50396 TOTAL TIME FOR SESSION Not performed   Bed Mobility    Transfer Training    Body Mechanics/Work Training    Patient Education NT - Patient edu to initiate Farrow use at night in addition to daytime use as tolerated.    NT - Patient education on Farrow wrap wear schedule starting with 2-3 hours during the day.  Provided with additional cotton stockinette base layers as Farrow stockinette a little snug at the top and at ankle crease.    NT - Patient education on compression options including pros and cons: compression bandaging versus Circaid reduction kit for treatment phase.  Patient elects to trial reduction kit and start with L LE.   THER EX  CPT 49620 TOTAL TIME FOR SESSION 8-22 Minutes   CARDIOVASCULAR    Nu Step L3 x 15 minutes following manual therapy with compression donned to B LEs   UBE    STRENGTHENING     Supine Ther-Ex    Standing Ther-Ex    Seated Ther-Ex    STRETCHING    Core Stabilization    LE Stretching    UE Stretching    Spinal Stretching    Postural     REPEATED MOVEMENTS    NEURO RE-ED  CPT 93236 TOTAL TIME FOR SESSION Not performed   COORDINATION    POSTURAL RE-ED    PRE-GAIT ACTIVITIES    BALANCE TRAINING    Sitting Balance    Standing Balance    KINESIOTAPE    GAIT TRAINING  CPT 18687 TOTAL TIME FOR SESSION Not performed   Ambulation     Dynamic Gait    MANUAL   CPT 22895 TOTAL TIME FOR SESSION 38-52 Minutes   Stretching    Mobilization    Massage- Deep Tissue, Scar, Transverse Friction    Manual Lymph Drainage MLD L LE sequence starting with groin prep and full L LE drainage sequence   Skin Care- Lotion/Wrapping    Measurement/Fitting NT -   Patient fit check for custom knee high Elvarex Cl 3F for R LE and L LE - R LE is of good fit, L  LE will need length modification - add 6 cm to top of garment with circumference of 73 cm.    L LE volumetrics taken.    NT - Fit assessment of new Farrow lower leg wrap for R LE with garment donned following MLD.    NT - Fit assess of Farrow basic leg wrap XL - garment is of good fit to L LE.  Ankle/foot wrap is too big (sent a XL long) and will request exchange.      NT - Trialed Tubigrip size G to L lower leg/foot as interim compression post MLD while waiting on garment.     Circaid reduction kit at ; insurance will only cover Farrow wrap so ordered via [facility].   Skin Stretching/Mobilization for Cording      GROUP  CPT 49036 TOTAL TIME FOR SESSION Not performed

## 2022-11-01 NOTE — PROGRESS NOTES
PT DAILY NOTE FOR OUTPATIENT THERAPY    Patient: Ramirez Montero MRN: [id number]  : 1980 41 y.o.  Referring Physician: Sanya Juarez Jr., *  Date of Visit: 2022    Certification Dates: 10/25/22 through 22    Diagnosis:   1. Lymphedema        Chief Complaints:  B LE lymphedema    Precautions:   Precautions comments: Hx of DM  Existing Precautions/Restrictions: no known precautions/restrictions     TODAY'S VISIT    Time In Session:  Start Time: 1500  Stop Time: 1556  Time Calculation (min): 56 min   History/Vitals/Pain/Encounter Info - 22 1451        Injury History/Precautions/Daily Required Info    Document Type daily treatment     Primary Therapist Jacqui Powell, PT, MSPT, CLT - [facility]     Chief Complaint/Reason for Visit  B LE lymphedema     Onset of Illness/Injury or Date of Surgery 22     Referring Physician Dr. Juarez, DPM     Existing Precautions/Restrictions no known precautions/restrictions     Precautions comments Hx of DM     History of present illness/functional impairment Patient presents with c/o B LE edema that has become more evident since his gastric bypass surgery in 2022.  He has since lost 100 lbs.  He recently saw his podiatrist who recommend consultation with a CLT.  Patient reports his edema has been there for a few years and is isolated to below the knees.  He states that his edema is effecting his work (works for [employer] long shifts) as his legs get hard and painful when he stands for a long time.  Tushar does state that his legs look best first thing in the morning or when he has a day off and can elevate his legs.  He has never used compression socks to date.     Patient/Family/Caregiver Comments/Observations Patient waiting on modifications to L Elvarex.  No other new reports.     Patient reported fall since last visit No        Pain Assessment    Currently in pain No/Denies     Preferred Pain Scale number (Numeric Rating Pain Scale)     Pain Side/Orientation  bilateral     Pain: Body location Leg     Pain Rating (0-10): Pre Activity 0        Pain Intervention    Intervention  Compression     Post Intervention Comments See assessment                Daily Treatment Assessment and Plan - 11/01/22 1542        Daily Treatment Assessment and Plan    Progress toward goals Progressing     Daily Outcome Summary Patient is maintaining R LE volume well with addition of Elvarex knee high stocking.  His L LE has some fluctuations in volume even with consistent use of Farrow wraps - awaiting custom Elvarex garment with necessary modifications for ideal fit.     Plan and Recommendations Continue 1x/week monitoring limb volume and awaiting R Elvarex garment.                     OBJECTIVE DATA TAKEN TODAY:    None taken    Today's Treatment:    ..LYMPHEDEMA PT FLOWSHEET    PT Lymph Exercises Current Session Time   MODALITIES TOTAL TIME FOR SESSION Not performed   Heat (CPT 90192)    Vasocompression/Ice (CPT 09584)3    Estim/H-Wave (CPT 39361)    Mechanical Traction (CPT 56199)    THER ACT  CPT 54785 TOTAL TIME FOR SESSION Not performed   Bed Mobility    Transfer Training    Body Mechanics/Work Training    Patient Education NT - Patient edu to initiate Farrow use at night in addition to daytime use as tolerated.    NT - Patient education on Farrow wrap wear schedule starting with 2-3 hours during the day.  Provided with additional cotton stockinette base layers as Farrow stockinette a little snug at the top and at ankle crease.    NT - Patient education on compression options including pros and cons: compression bandaging versus Circaid reduction kit for treatment phase.  Patient elects to trial reduction kit and start with L LE.   THER EX  CPT 08393 TOTAL TIME FOR SESSION 8-22 Minutes   CARDIOVASCULAR    Nu Step L3 x 15 minutes following manual therapy with compression donned to B LEs   UBE    STRENGTHENING     Supine Ther-Ex    Standing Ther-Ex    Seated Ther-Ex    STRETCHING    Core  Stabilization    LE Stretching    UE Stretching    Spinal Stretching    Postural     REPEATED MOVEMENTS    NEURO RE-ED  CPT 58278 TOTAL TIME FOR SESSION Not performed   COORDINATION    POSTURAL RE-ED    PRE-GAIT ACTIVITIES    BALANCE TRAINING    Sitting Balance    Standing Balance    KINESIOTAPE    GAIT TRAINING  CPT 64409 TOTAL TIME FOR SESSION Not performed   Ambulation     Dynamic Gait    MANUAL   CPT 38367 TOTAL TIME FOR SESSION 38-52 Minutes   Stretching    Mobilization    Massage- Deep Tissue, Scar, Transverse Friction    Manual Lymph Drainage MLD L LE sequence starting with groin prep and full L LE drainage sequence   Skin Care- Lotion/Wrapping    Measurement/Fitting NT -   Patient fit check for custom knee high Elvarex Cl 3F for R LE and L LE - R LE is of good fit, L LE will need length modification - add 6 cm to top of garment with circumference of 73 cm.    L LE volumetrics taken.    NT - Fit assessment of new Farrow lower leg wrap for R LE with garment donned following MLD.    NT - Fit assess of Farrow basic leg wrap XL - garment is of good fit to L LE.  Ankle/foot wrap is too big (sent a XL long) and will request exchange.      NT - Trialed Tubigrip size G to L lower leg/foot as interim compression post MLD while waiting on garment.     Circaid reduction kit at ; insurance will only cover Farrow wrap so ordered via [facility].   Skin Stretching/Mobilization for Cording      GROUP  CPT 27296 TOTAL TIME FOR SESSION Not performed

## 2022-11-08 ENCOUNTER — HOSPITAL ENCOUNTER (OUTPATIENT)
Dept: PHYSICAL THERAPY | Age: 42
Setting detail: THERAPIES SERIES
Discharge: HOME | End: 2022-11-08
Attending: PODIATRIST
Payer: COMMERCIAL

## 2022-11-08 DIAGNOSIS — I89.0 LYMPHEDEMA: Primary | ICD-10-CM

## 2022-11-08 PROCEDURE — 97140 MANUAL THERAPY 1/> REGIONS: CPT | Mod: GP,U8

## 2022-11-08 PROCEDURE — 97110 THERAPEUTIC EXERCISES: CPT | Mod: GP,U8

## 2022-11-08 NOTE — PROGRESS NOTES
PT DAILY NOTE FOR OUTPATIENT THERAPY    Patient: Ramirez Montero MRN: [id number]  : 1980 41 y.o.  Referring Physician: Sanya Juarez Jr., *  Date of Visit: 2022    Certification Dates: 10/25/22 through 22    Diagnosis:   1. Lymphedema        Chief Complaints:  B LE lymphedema    Precautions:   Precautions comments: Hx of DM  Existing Precautions/Restrictions: no known precautions/restrictions     TODAY'S VISIT    Time In Session:  Start Time: 1455  Stop Time: 1555  Time Calculation (min): 60 min   History/Vitals/Pain/Encounter Info - 22 1458        Injury History/Precautions/Daily Required Info    Document Type daily treatment     Primary Therapist Jacuqi Powell, PT, MSPT, CLT - [facility]     Chief Complaint/Reason for Visit  B LE lymphedema     Onset of Illness/Injury or Date of Surgery 22     Referring Physician Dr. Juarez, DPM     Existing Precautions/Restrictions no known precautions/restrictions     Precautions comments Hx of DM     History of present illness/functional impairment Patient presents with c/o B LE edema that has become more evident since his gastric bypass surgery in 2022.  He has since lost 100 lbs.  He recently saw his podiatrist who recommend consultation with a CLT.  Patient reports his edema has been there for a few years and is isolated to below the knees.  He states that his edema is effecting his work (works for [employer] long shifts) as his legs get hard and painful when he stands for a long time.  Tushar does state that his legs look best first thing in the morning or when he has a day off and can elevate his legs.  He has never used compression socks to date.     Patient/Family/Caregiver Comments/Observations Patient recieved his second R Elvarex garment but is still waiting on his L garment for modifications.     Patient reported fall since last visit No        Pain Assessment    Currently in pain No/Denies     Preferred Pain Scale number (Numeric Rating Pain  Scale)     Pain Rating (0-10): Pre Activity 0     Pain Description intermittent;sore        Pain Intervention    Intervention  MLD, compression     Post Intervention Comments See assessment                Daily Treatment Assessment and Plan - 11/08/22 6207        Daily Treatment Assessment and Plan    Progress toward goals Progressing     Daily Outcome Summary Patient is doing well with current compression but still waiting on his new and modified L Elvarex garment.  Will continue to monitor weekly until new garment arrives for fit assessement and patient trial of wear for efficacy at maintaining limb reduction.     Plan and Recommendations Continue 1x/week monitoring limb volume and awaiting R Elvarex garment.                     OBJECTIVE DATA TAKEN TODAY:    None taken    Today's Treatment:    ..LYMPHEDEMA PT FLOWSHEET    PT Lymph Exercises Current Session Time   MODALITIES TOTAL TIME FOR SESSION Not performed   Heat (CPT 52058)    Vasocompression/Ice (CPT 15869)3    Estim/H-Wave (CPT 71439)    Mechanical Traction (CPT 53415)    THER ACT  CPT 56945 TOTAL TIME FOR SESSION Not performed   Bed Mobility    Transfer Training    Body Mechanics/Work Training    Patient Education NT - Patient edu to initiate Farrow use at night in addition to daytime use as tolerated.    NT - Patient education on Farrow wrap wear schedule starting with 2-3 hours during the day.  Provided with additional cotton stockinette base layers as Farrow stockinette a little snug at the top and at ankle crease.    NT - Patient education on compression options including pros and cons: compression bandaging versus Circaid reduction kit for treatment phase.  Patient elects to trial reduction kit and start with L LE.   THER EX  CPT 11314 TOTAL TIME FOR SESSION 8-22 Minutes   CARDIOVASCULAR    Nu Step L3 x 15 minutes following manual therapy with compression donned to B LEs   UBE    STRENGTHENING     Supine Ther-Ex    Standing Ther-Ex    Seated Ther-Ex     STRETCHING    Core Stabilization    LE Stretching    UE Stretching    Spinal Stretching    Postural     REPEATED MOVEMENTS    NEURO RE-ED  CPT 48868 TOTAL TIME FOR SESSION Not performed   COORDINATION    POSTURAL RE-ED    PRE-GAIT ACTIVITIES    BALANCE TRAINING    Sitting Balance    Standing Balance    KINESIOTAPE    GAIT TRAINING  CPT 18610 TOTAL TIME FOR SESSION Not performed   Ambulation     Dynamic Gait    MANUAL   CPT 77693 TOTAL TIME FOR SESSION 38-52 Minutes   Stretching    Mobilization    Massage- Deep Tissue, Scar, Transverse Friction    Manual Lymph Drainage MLD L LE sequence starting with groin prep and full L LE drainage sequence   Skin Care- Lotion/Wrapping    Measurement/Fitting NT -   Patient fit check for custom knee high Elvarex Cl 3F for R LE and L LE - R LE is of good fit, L LE will need length modification - add 6 cm to top of garment with circumference of 73 cm.    L LE volumetrics taken.    NT - Fit assessment of new Farrow lower leg wrap for R LE with garment donned following MLD.    NT - Fit assess of Farrow basic leg wrap XL - garment is of good fit to L LE.  Ankle/foot wrap is too big (sent a XL long) and will request exchange.      NT - Trialed Tubigrip size G to L lower leg/foot as interim compression post MLD while waiting on garment.     Circaid reduction kit at ; insurance will only cover Farrow wrap so ordered via [facility].   Skin Stretching/Mobilization for Cording      GROUP  CPT 18567 TOTAL TIME FOR SESSION Not performed

## 2022-11-08 NOTE — OP PT TREATMENT LOG
..LYMPHEDEMA PT FLOWSHEET    PT Lymph Exercises Current Session Time   MODALITIES TOTAL TIME FOR SESSION Not performed   Heat (CPT 42576)    Vasocompression/Ice (CPT 25506)3    Estim/H-Wave (CPT 84646)    Mechanical Traction (CPT 76007)    THER ACT  CPT 53195 TOTAL TIME FOR SESSION Not performed   Bed Mobility    Transfer Training    Body Mechanics/Work Training    Patient Education NT - Patient edu to initiate Farrow use at night in addition to daytime use as tolerated.    NT - Patient education on Farrow wrap wear schedule starting with 2-3 hours during the day.  Provided with additional cotton stockinette base layers as Farrow stockinette a little snug at the top and at ankle crease.    NT - Patient education on compression options including pros and cons: compression bandaging versus Circaid reduction kit for treatment phase.  Patient elects to trial reduction kit and start with L LE.   THER EX  CPT 90366 TOTAL TIME FOR SESSION 8-22 Minutes   CARDIOVASCULAR    Nu Step L3 x 15 minutes following manual therapy with compression donned to B LEs   UBE    STRENGTHENING     Supine Ther-Ex    Standing Ther-Ex    Seated Ther-Ex    STRETCHING    Core Stabilization    LE Stretching    UE Stretching    Spinal Stretching    Postural     REPEATED MOVEMENTS    NEURO RE-ED  CPT 08650 TOTAL TIME FOR SESSION Not performed   COORDINATION    POSTURAL RE-ED    PRE-GAIT ACTIVITIES    BALANCE TRAINING    Sitting Balance    Standing Balance    KINESIOTAPE    GAIT TRAINING  CPT 81400 TOTAL TIME FOR SESSION Not performed   Ambulation     Dynamic Gait    MANUAL   CPT 03090 TOTAL TIME FOR SESSION 38-52 Minutes   Stretching    Mobilization    Massage- Deep Tissue, Scar, Transverse Friction    Manual Lymph Drainage MLD L LE sequence starting with groin prep and full L LE drainage sequence   Skin Care- Lotion/Wrapping    Measurement/Fitting NT -   Patient fit check for custom knee high Elvarex Cl 3F for R LE and L LE - R LE is of good fit, L  LE will need length modification - add 6 cm to top of garment with circumference of 73 cm.    L LE volumetrics taken.    NT - Fit assessment of new Farrow lower leg wrap for R LE with garment donned following MLD.    NT - Fit assess of Farrow basic leg wrap XL - garment is of good fit to L LE.  Ankle/foot wrap is too big (sent a XL long) and will request exchange.      NT - Trialed Tubigrip size G to L lower leg/foot as interim compression post MLD while waiting on garment.     Circaid reduction kit at ; insurance will only cover Farrow wrap so ordered via [facility].   Skin Stretching/Mobilization for Cording      GROUP  CPT 98366 TOTAL TIME FOR SESSION Not performed

## 2022-11-15 ENCOUNTER — HOSPITAL ENCOUNTER (OUTPATIENT)
Dept: PHYSICAL THERAPY | Age: 42
Setting detail: THERAPIES SERIES
Discharge: HOME | End: 2022-11-15
Attending: PODIATRIST
Payer: COMMERCIAL

## 2022-11-15 DIAGNOSIS — I89.0 LYMPHEDEMA: Primary | ICD-10-CM

## 2022-11-15 PROCEDURE — 97530 THERAPEUTIC ACTIVITIES: CPT | Mod: GP,U8

## 2022-11-15 PROCEDURE — 97140 MANUAL THERAPY 1/> REGIONS: CPT | Mod: GP,U8

## 2022-11-15 PROCEDURE — 97110 THERAPEUTIC EXERCISES: CPT | Mod: GP,U8

## 2022-11-15 NOTE — OP PT TREATMENT LOG
..LYMPHEDEMA PT FLOWSHEET    PT Lymph Exercises Current Session Time   MODALITIES TOTAL TIME FOR SESSION Not performed   Heat (CPT 88807)    Vasocompression/Ice (CPT 35184)3    Estim/H-Wave (CPT 45308)    Mechanical Traction (CPT 29139)    THER ACT  CPT 40011 TOTAL TIME FOR SESSION 8-22 Minutes   Bed Mobility    Transfer Training    Body Mechanics/Work Training    Patient Education Patient education on new wear schedule with B LE flat knit garments - patient to wear daily over next week.  He can double up as tolerated/needed or switch out for his Farrow Wraps on occasion if needed.  Patient verbalizes understanding of wear schedule, laundering/hygiene practices.      NT - Patient edu to initiate Farrow use at night in addition to daytime use as tolerated.    NT - Patient education on Farrow wrap wear schedule starting with 2-3 hours during the day.  Provided with additional cotton stockinette base layers as Farrow stockinette a little snug at the top and at ankle crease.    NT - Patient education on compression options including pros and cons: compression bandaging versus Circaid reduction kit for treatment phase.  Patient elects to trial reduction kit and start with L LE.   THER EX  CPT 52256 TOTAL TIME FOR SESSION 8-22 Minutes   CARDIOVASCULAR    Nu Step L5 x 20 minutes with flat knit compression garments donned to B LEs   UBE    STRENGTHENING     Supine Ther-Ex    Standing Ther-Ex    Seated Ther-Ex    STRETCHING    Core Stabilization    LE Stretching    UE Stretching    Spinal Stretching    Postural     REPEATED MOVEMENTS    NEURO RE-ED  CPT 04707 TOTAL TIME FOR SESSION Not performed   COORDINATION    POSTURAL RE-ED    PRE-GAIT ACTIVITIES    BALANCE TRAINING    Sitting Balance    Standing Balance    KINESIOTAPE    GAIT TRAINING  CPT 32688 TOTAL TIME FOR SESSION Not performed   Ambulation     Dynamic Gait    MANUAL   CPT 46584 TOTAL TIME FOR SESSION 8-22 Minutes   Stretching    Mobilization    Massage- Deep  Tissue, Scar, Transverse Friction    Manual Lymph Drainage NT - MLD L LE sequence starting with groin prep and full L LE drainage sequence   Skin Care- Lotion/Wrapping    Measurement/Fitting Patient fit check for custom knee high Elvarex Cl 3F for L LE - garment is of good fit with new modifications.  Patient able to Ind don/doff.    NT - L LE volumetrics taken.    NT - Fit assessment of new Farrow lower leg wrap for R LE with garment donned following MLD.    NT - Fit assess of Farrow basic leg wrap XL - garment is of good fit to L LE.  Ankle/foot wrap is too big (sent a XL long) and will request exchange.      NT - Trialed Tubigrip size G to L lower leg/foot as interim compression post MLD while waiting on garment.     Circaid reduction kit at ; insurance will only cover Farrow wrap so ordered via [facility].   Skin Stretching/Mobilization for Cording      GROUP  CPT 05575 TOTAL TIME FOR SESSION Not performed

## 2022-11-15 NOTE — PROGRESS NOTES
PT DAILY NOTE FOR OUTPATIENT THERAPY    Patient: Ramirez Montero MRN: [id number]  : 1980 41 y.o.  Referring Physician: Sanya Juarez Jr., *  Date of Visit: 11/15/2022    Certification Dates: 10/25/22 through 22    Diagnosis:   1. Lymphedema        Chief Complaints:  B LE lymphedema    Precautions:   Precautions comments: Hx of DM  Existing Precautions/Restrictions: no known precautions/restrictions     TODAY'S VISIT    Time In Session:  Start Time: 1503  Stop Time: 1548  Time Calculation (min): 45 min   History/Vitals/Pain/Encounter Info - 11/15/22 1505        Injury History/Precautions/Daily Required Info    Document Type daily treatment     Primary Therapist Jacqui Powell, PT, MSPT, CLT - [facility]     Chief Complaint/Reason for Visit  B LE lymphedema     Onset of Illness/Injury or Date of Surgery 22     Referring Physician Dr. Juarez, DPM     Existing Precautions/Restrictions no known precautions/restrictions     Precautions comments Hx of DM     History of present illness/functional impairment Patient presents with c/o B LE edema that has become more evident since his gastric bypass surgery in 2022.  He has since lost 100 lbs.  He recently saw his podiatrist who recommend consultation with a CLT.  Patient reports his edema has been there for a few years and is isolated to below the knees.  He states that his edema is effecting his work (works for [employer] long shifts) as his legs get hard and painful when he stands for a long time.  Tushar does state that his legs look best first thing in the morning or when he has a day off and can elevate his legs.  He has never used compression socks to date.     Patient/Family/Caregiver Comments/Observations Patient presents with his L newly revised Elvarex garment.  He reports his ankles are sore from walking thru a grassy field for a [employer].  He states he has lost approximately another 50 lbs since the start of PT on his weight loss journey.     Patient  reported fall since last visit No        Pain Assessment    Currently in pain Yes     Preferred Pain Scale number (Numeric Rating Pain Scale)     Pain Side/Orientation bilateral     Pain: Body location Ankle     Pain Rating (0-10): Pre Activity 2     Pain Description sore        Pain Intervention    Intervention  N/A     Post Intervention Comments N/A                Daily Treatment Assessment and Plan - 11/15/22 1505        Daily Treatment Assessment and Plan    Progress toward goals Progressing     Daily Outcome Summary Patient's new L LE flat knit custom Elvarex is now of good fit with modifications made.  Patient Ind with donning and doffing B LE garments and verbalizes understanding of recommended wear schedule and care over the next 1 week.  Anticipate patient will be able to DC to self care next visit.  He reports significant comfort in B Elvarex garments and his jeans are now a loose fit around his lower legs end of session.     Plan and Recommendations Anticipate DC next visit with successful use of new custom flat knit garments; reassess volumes.                     OBJECTIVE DATA TAKEN TODAY:    None taken    Today's Treatment:    ..LYMPHEDEMA PT FLOWSHEET    PT Lymph Exercises Current Session Time   MODALITIES TOTAL TIME FOR SESSION Not performed   Heat (CPT 07914)    Vasocompression/Ice (CPT 11458)3    Estim/H-Wave (CPT 53176)    Mechanical Traction (CPT 66544)    THER ACT  CPT 07240 TOTAL TIME FOR SESSION 8-22 Minutes   Bed Mobility    Transfer Training    Body Mechanics/Work Training    Patient Education Patient education on new wear schedule with B LE flat knit garments - patient to wear daily over next week.  He can double up as tolerated/needed or switch out for his Farrow Wraps on occasion if needed.  Patient verbalizes understanding of wear schedule, laundering/hygiene practices.      NT - Patient edu to initiate Farrow use at night in addition to daytime use as tolerated.    NT - Patient  education on Farrow wrap wear schedule starting with 2-3 hours during the day.  Provided with additional cotton stockinette base layers as Farrow stockinette a little snug at the top and at ankle crease.    NT - Patient education on compression options including pros and cons: compression bandaging versus Circaid reduction kit for treatment phase.  Patient elects to trial reduction kit and start with L LE.   THER EX  CPT 15782 TOTAL TIME FOR SESSION 8-22 Minutes   CARDIOVASCULAR    Nu Step L5 x 20 minutes with flat knit compression garments donned to B LEs   UBE    STRENGTHENING     Supine Ther-Ex    Standing Ther-Ex    Seated Ther-Ex    STRETCHING    Core Stabilization    LE Stretching    UE Stretching    Spinal Stretching    Postural     REPEATED MOVEMENTS    NEURO RE-ED  CPT 93092 TOTAL TIME FOR SESSION Not performed   COORDINATION    POSTURAL RE-ED    PRE-GAIT ACTIVITIES    BALANCE TRAINING    Sitting Balance    Standing Balance    KINESIOTAPE    GAIT TRAINING  CPT 12881 TOTAL TIME FOR SESSION Not performed   Ambulation     Dynamic Gait    MANUAL   CPT 64004 TOTAL TIME FOR SESSION 8-22 Minutes   Stretching    Mobilization    Massage- Deep Tissue, Scar, Transverse Friction    Manual Lymph Drainage NT - MLD L LE sequence starting with groin prep and full L LE drainage sequence   Skin Care- Lotion/Wrapping    Measurement/Fitting Patient fit check for custom knee high Elvarex Cl 3F for L LE - garment is of good fit with new modifications.  Patient able to Ind don/doff.    NT - L LE volumetrics taken.    NT - Fit assessment of new Farrow lower leg wrap for R LE with garment donned following MLD.    NT - Fit assess of Farrow basic leg wrap XL - garment is of good fit to L LE.  Ankle/foot wrap is too big (sent a XL long) and will request exchange.      NT - Trialed Tubigrip size G to L lower leg/foot as interim compression post MLD while waiting on garment.     Circaid reduction kit at ; insurance will only cover  Ignacio wrap so ordered via [facility].   Skin Stretching/Mobilization for Cording      GROUP  CPT 77765 TOTAL TIME FOR SESSION Not performed

## 2022-11-22 ENCOUNTER — HOSPITAL ENCOUNTER (OUTPATIENT)
Dept: PHYSICAL THERAPY | Age: 42
Setting detail: THERAPIES SERIES
Discharge: HOME | End: 2022-11-22
Attending: PODIATRIST
Payer: COMMERCIAL

## 2022-11-22 DIAGNOSIS — I89.0 LYMPHEDEMA: Primary | ICD-10-CM

## 2022-11-22 PROCEDURE — 97140 MANUAL THERAPY 1/> REGIONS: CPT | Mod: GP,U8

## 2022-11-22 NOTE — OP PT TREATMENT LOG
..LYMPHEDEMA PT FLOWSHEET    PT Lymph Exercises Current Session Time   MODALITIES TOTAL TIME FOR SESSION Not performed   Heat (CPT 16818)    Vasocompression/Ice (CPT 03322)3    Estim/H-Wave (CPT 23436)    Mechanical Traction (CPT 79522)    THER ACT  CPT 45350 TOTAL TIME FOR SESSION 0-7 Minutes   Bed Mobility    Transfer Training    Body Mechanics/Work Training    Patient Education See below    NT - Patient education on new wear schedule with B LE flat knit garments - patient to wear daily over next week.  He can double up as tolerated/needed or switch out for his Farrow Wraps on occasion if needed.  Patient verbalizes understanding of wear schedule, laundering/hygiene practices.      NT - Patient edu to initiate Farrow use at night in addition to daytime use as tolerated.    NT - Patient education on Farrow wrap wear schedule starting with 2-3 hours during the day.  Provided with additional cotton stockinette base layers as Farrow stockinette a little snug at the top and at ankle crease.    NT - Patient education on compression options including pros and cons: compression bandaging versus Circaid reduction kit for treatment phase.  Patient elects to trial reduction kit and start with L LE.   THER EX  CPT 26140 TOTAL TIME FOR SESSION 8-22 Minutes   CARDIOVASCULAR    Nu Step L5 x 20 minutes with flat knit compression garments donned to B LEs   UBE    STRENGTHENING     Supine Ther-Ex    Standing Ther-Ex    Seated Ther-Ex    STRETCHING    Core Stabilization    LE Stretching    UE Stretching    Spinal Stretching    Postural     REPEATED MOVEMENTS    NEURO RE-ED  CPT 65138 TOTAL TIME FOR SESSION Not performed   COORDINATION    POSTURAL RE-ED    PRE-GAIT ACTIVITIES    BALANCE TRAINING    Sitting Balance    Standing Balance    KINESIOTAPE    GAIT TRAINING  CPT 26454 TOTAL TIME FOR SESSION Not performed   Ambulation     Dynamic Gait    MANUAL   CPT 14310 TOTAL TIME FOR SESSION 23-37 Minutes   Stretching    Mobilization     Massage- Deep Tissue, Scar, Transverse Friction    Manual Lymph Drainage NT - MLD L LE sequence starting with groin prep and full L LE drainage sequence   Skin Care- Lotion/Wrapping    Measurement/Fitting Patient fit check for custom knee high Elvarex Cl 3F for B LE and B LE volumetrics taken to assess garment efficacy.  Reviewed compression daytime daily use and rotating Farrow wraps in regularly for added limb volume maintenance. Patient edu on option to double up Elvarex garments as tolerated as well.    NT - Fit assessment of new Farrow lower leg wrap for R LE with garment donned following MLD.    NT - Fit assess of Farrow basic leg wrap XL - garment is of good fit to L LE.  Ankle/foot wrap is too big (sent a XL long) and will request exchange.      NT - Trialed Tubigrip size G to L lower leg/foot as interim compression post MLD while waiting on garment.     Circaid reduction kit at ; insurance will only cover Farrow wrap so ordered via [facility].   Skin Stretching/Mobilization for Cording      GROUP  CPT 55393 TOTAL TIME FOR SESSION Not performed

## 2022-11-22 NOTE — LETTER
[address]  [facility] [address]  [place] [facility] Fax: [phone]    PHYSICAL THERAPY DISCHARGE    Patient Name:  Ramirez Montero    Provider: Jacqui Powell, PT  Referring Provider: Sanya Juarez Jr., *  PCP: Argentina Carr MD  [facility] / Plan: [employer] FIRST / Product Type: MA Managed Care /   Medical Diagnosis: Lymphedema [I89.0]     Dear Dr. Juarez,    Thank you for the referral of your patient Ramirez Montero for physical therapy. I am writing to you today to make you aware that your patient is being discharged from physical therapy. Please review the latest progress note below and the goals that have been addressed during this plan of care.     If you have any questions or concerns, please feel free to contact me. Once again, thank you for your referral and the opportunity to work with your patient.     Sincerely,  Jacqui Powell, PT      PT DISCHARGE NOTE FOR OUTPATIENT THERAPY    Patient: Ramirez Montero MRN: [id number]  : 1980 41 y.o.  Referring Physician: Sanya Juarez Jr., *  Date of Visit: 2022      Certification Dates: 10/25/22 through 22    Total Visit Count: 17    Chief Complaints:  Chief Complaint   Patient presents with    Other       Precautions:  Precautions comments: Hx of DM  Existing Precautions/Restrictions: no known precautions/restrictions     TODAY'S VISIT:    Time In Session:  Start Time: 1500  Stop Time: 1528  Time Calculation (min): 28 min   General Information - 22 1458        Session Details    Document Type discharge evaluation     Mode of Treatment individual therapy;physical therapy     Patient/Family/Caregiver Comments/Observations Patient presents wearing Elvarex stockings.  Denies issues with daily wear.  Overall comfortable and no issues donning/doffing.  He has not doubled them up to date and is still waiting second L garment.        General Information    Onset of Illness/Injury or Date of Surgery  03/03/22     Referring Physician Dr. Juarez, DPM     History of present illness/functional impairment Patient presents with c/o B LE edema that has become more evident since his gastric bypass surgery in March 2022.  He has since lost 100 lbs.  He recently saw his podiatrist who recommend consultation with a CLT.  Patient reports his edema has been there for a few years and is isolated to below the knees.  He states that his edema is effecting his work (works for [employer] long shifts) as his legs get hard and painful when he stands for a long time.  Tushar does state that his legs look best first thing in the morning or when he has a day off and can elevate his legs.  He has never used compression socks to date.     Existing Precautions/Restrictions no known precautions/restrictions     Precautions comments Hx of DM                Daily Falls Screen - 11/22/22 1458        Daily Falls Assessment    Patient reported fall since last visit No                Pain/Vitals - 11/22/22 1458        Pain Assessment    Currently in pain No/Denies     Pain Rating (0-10): Pre Activity 0        Pain Intervention    Intervention  N/A     Post Intervention Comments N/A                PT - 11/22/22 3847        Physical Therapy    Physical Therapy Specialty Lymphedema Program        PT Plan    Frequency of treatment 2 times/week     PT Duration 4 weeks     PT Cert From 10/25/22     PT Cert To 11/22/22     Date PT POC was sent to provider 10/25/22     Signed PT Plan of Care received?  No   POC refaxed 9/27.               Assessment and Plan - 11/22/22 3838        Assessment    Plan of Care reviewed and patient/family in agreement Yes     Clinical Assessment Patient has met all PT goals at this time and is Ind with use of compression strategies including Elvarex custom knee highs and Farrow Basic Leg Wraps.  He demonstrates well maintained volume reduction and improved skin quality and texture. Patient educated that garment will need to be  refitted/replaced in 6 mos or possibly sooner as he continues to progress on his weight loss journey.  Patient verbalizes understanding.  He has met his goals and is Ind with self care - DC to self care at this time.     Plan and Recommendations DC PT to self care.                 OBJECTIVE MEASUREMENTS/DATA:    Lymphedema     Lymphedema Assessment - 11/22/22 1500        Skin    Skin Condition Intact     Odor comments None     Sensation comments Intact LT     Other comments Dry/flaky skin in areas although patient is compliant with use of moisturizer several times a day - did not apply before PT today.        Palpation    LE Palpation  Soft, non pitting edema B lower legs, ankles and feet; non tender        Outcome Measures    LLIS results/comments LLIS 2 = 9        Affected LE Measurements    Affected limb laterality Right     MTP 25.8 cm     -8 cm 29.2 cm     0 cm 37 cm     4 cm 49.9 cm     8 cm 53 cm     12 cm 57.5 cm     16 cm 61.4 cm     20 cm 65.3 cm     24 cm 65.5 cm     28 cm 64 cm     32 cm 63 cm     Affected LE Total Volume (ml) 866484.29 ml        Other Affected LE Circumference    Unaffected limb laterality Left     MTP 26.5 cm     -8 cm 29.2 cm     0 cm 36.8 cm     4 cm 49.5 cm     8 cm 55.8 cm     12 cm 62.8 cm     16 cm 68 cm     20 cm 71.5 cm     24 cm 72 cm     28 cm 72.6 cm     32 cm 65.8 cm     Other Affected LE Total Volume (ml) 521392.36 ml        Garments    Day use Elvarex/Farrow Wraps                       Today's Treatment:    Education provided:  Yes: See treatment log for details of education provided    ..LYMPHEDEMA PT FLOWSHEET    PT Lymph Exercises Current Session Time   MODALITIES TOTAL TIME FOR SESSION Not performed   Heat (CPT 20578)    Vasocompression/Ice (CPT 27738)3    Estim/H-Wave (CPT 41791)    Mechanical Traction (CPT 68415)    THER ACT  CPT 38201 TOTAL TIME FOR SESSION 0-7 Minutes   Bed Mobility    Transfer Training    Body Mechanics/Work Training    Patient Education See  below    NT - Patient education on new wear schedule with B LE flat knit garments - patient to wear daily over next week.  He can double up as tolerated/needed or switch out for his Farrow Wraps on occasion if needed.  Patient verbalizes understanding of wear schedule, laundering/hygiene practices.      NT - Patient edu to initiate Farrow use at night in addition to daytime use as tolerated.    NT - Patient education on Farrow wrap wear schedule starting with 2-3 hours during the day.  Provided with additional cotton stockinette base layers as Farrow stockinette a little snug at the top and at ankle crease.    NT - Patient education on compression options including pros and cons: compression bandaging versus Circaid reduction kit for treatment phase.  Patient elects to trial reduction kit and start with L LE.   THER EX  CPT 40105 TOTAL TIME FOR SESSION 8-22 Minutes   CARDIOVASCULAR    Nu Step L5 x 20 minutes with flat knit compression garments donned to B LEs   UBE    STRENGTHENING     Supine Ther-Ex    Standing Ther-Ex    Seated Ther-Ex    STRETCHING    Core Stabilization    LE Stretching    UE Stretching    Spinal Stretching    Postural     REPEATED MOVEMENTS    NEURO RE-ED  CPT 26285 TOTAL TIME FOR SESSION Not performed   COORDINATION    POSTURAL RE-ED    PRE-GAIT ACTIVITIES    BALANCE TRAINING    Sitting Balance    Standing Balance    KINESIOTAPE    GAIT TRAINING  CPT 58965 TOTAL TIME FOR SESSION Not performed   Ambulation     Dynamic Gait    MANUAL   CPT 98624 TOTAL TIME FOR SESSION 23-37 Minutes   Stretching    Mobilization    Massage- Deep Tissue, Scar, Transverse Friction    Manual Lymph Drainage NT - MLD L LE sequence starting with groin prep and full L LE drainage sequence   Skin Care- Lotion/Wrapping    Measurement/Fitting Patient fit check for custom knee high Elvarex Cl 3F for B LE and B LE volumetrics taken to assess garment efficacy.  Reviewed compression daytime daily use and rotating Farrow wraps in  regularly for added limb volume maintenance. Patient edu on option to double up Elvarex garments as tolerated as well.    NT - Fit assessment of new Farrow lower leg wrap for R LE with garment donned following MLD.    NT - Fit assess of Farrow basic leg wrap XL - garment is of good fit to L LE.  Ankle/foot wrap is too big (sent a XL long) and will request exchange.      NT - Trialed Tubigrip size G to L lower leg/foot as interim compression post MLD while waiting on garment.     Circaid reduction kit at ; insurance will only cover Farrow wrap so ordered via [facility].   Skin Stretching/Mobilization for Cording      GROUP  CPT 34539 TOTAL TIME FOR SESSION Not performed                     Goals Addressed                 This Visit's Progress     Mutually agreed upon pain goal        Mutually agreed upon pain goal: Patient will have reduced LE pain and heaviness during work shifts 12 hours or more for 1 week to 2/10 max.    MET       PT Lymph Goals        Patient will be able to teach back 3 skin care practices for infection reduction risk. Short Term 3 weeks Met   Patient will be able to tolerate L LE compression as recommended for 10 hours + a day. Short Term 3 weeks Met   Patient will demonstrate a L LE volume reduction of at least 10 %. Short Term 4 weeks Met   Patient will have reduced R LE volume by 5% or more Short Term 6 weeks Met   Patient will be Ind with skin care practices in order to reduce infection risk of B LE and avoid hospitalizations for cellulits. Long Term 8 weeks Met   Patient will be Ind with use of appropriate compression strategies to maintain B LE limb status and avoid progression of lymphedema to next stage. Long Term 8 weeks Met   Patient will have improved LLIS score of 20% impairment or better. Long Term 8 weeks Met               Discharge information for CARF:

## 2023-01-17 ENCOUNTER — RX ONLY (OUTPATIENT)
Age: 43
Setting detail: RX ONLY
End: 2023-01-17

## 2023-01-17 RX ORDER — TACROLIMUS 1 MG/G
OINTMENT TOPICAL BID
Qty: 30 | Refills: 2 | Status: ERX

## 2023-04-05 ENCOUNTER — APPOINTMENT (RX ONLY)
Dept: URBAN - METROPOLITAN AREA CLINIC 374 | Facility: CLINIC | Age: 43
Setting detail: DERMATOLOGY
End: 2023-04-05

## 2023-04-05 DIAGNOSIS — L259 CONTACT DERMATITIS AND OTHER ECZEMA, UNSPECIFIED CAUSE: ICD-10-CM

## 2023-04-05 PROBLEM — L30.8 OTHER SPECIFIED DERMATITIS: Status: ACTIVE | Noted: 2023-04-05

## 2023-04-05 PROCEDURE — ? PRESCRIPTION MEDICATION MANAGEMENT

## 2023-04-05 PROCEDURE — ? PHOTO-DOCUMENTATION

## 2023-04-05 PROCEDURE — 99213 OFFICE O/P EST LOW 20 MIN: CPT

## 2023-04-05 PROCEDURE — ? PRESCRIPTION

## 2023-04-05 RX ORDER — TRIAMCINOLONE ACETONIDE 1 MG/G
CREAM TOPICAL BID
Qty: 454 | Refills: 2 | Status: ERX | COMMUNITY
Start: 2023-04-05

## 2023-04-05 RX ORDER — TACROLIMUS 1 MG/G
OINTMENT TOPICAL BID
Qty: 100 | Refills: 3 | Status: ERX

## 2023-04-05 RX ORDER — AMMONIUM LACTATE 12 G/100G
LOTION TOPICAL QDAY
Qty: 400 | Refills: 3 | Status: ERX | COMMUNITY
Start: 2023-04-05

## 2023-04-05 RX ADMIN — AMMONIUM LACTATE: 12 LOTION TOPICAL at 00:00

## 2023-04-05 RX ADMIN — TRIAMCINOLONE ACETONIDE: 1 CREAM TOPICAL at 00:00

## 2023-04-05 ASSESSMENT — LOCATION DETAILED DESCRIPTION DERM
LOCATION DETAILED: LEFT PROXIMAL PRETIBIAL REGION
LOCATION DETAILED: RIGHT DISTAL PRETIBIAL REGION

## 2023-04-05 ASSESSMENT — LOCATION ZONE DERM: LOCATION ZONE: LEG

## 2023-04-05 ASSESSMENT — LOCATION SIMPLE DESCRIPTION DERM
LOCATION SIMPLE: RIGHT PRETIBIAL REGION
LOCATION SIMPLE: LEFT PRETIBIAL REGION

## 2023-04-05 NOTE — PROCEDURE: PRESCRIPTION MEDICATION MANAGEMENT
Continue Regimen: Protopic 0.1% topical ointment: apply thin layer to lower legs qday\\nAmmonium lactate 12% lotion: apply thin layer to body qday after shower
Initiate Treatment: RESTART\\ntriamcinolone acetonide 0.1% topical cream: Apply thin layer to flare up areas BID PRN flare
Detail Level: Simple
Render In Strict Bullet Format?: No

## 2023-08-03 ENCOUNTER — APPOINTMENT (RX ONLY)
Dept: URBAN - METROPOLITAN AREA CLINIC 374 | Facility: CLINIC | Age: 43
Setting detail: DERMATOLOGY
End: 2023-08-03

## 2023-08-03 DIAGNOSIS — L259 CONTACT DERMATITIS AND OTHER ECZEMA, UNSPECIFIED CAUSE: ICD-10-CM

## 2023-08-03 PROBLEM — L30.9 DERMATITIS, UNSPECIFIED: Status: ACTIVE | Noted: 2023-08-03

## 2023-08-03 PROCEDURE — ? PHOTO-DOCUMENTATION

## 2023-08-03 PROCEDURE — ? MEDICATION COUNSELING

## 2023-08-03 PROCEDURE — 99213 OFFICE O/P EST LOW 20 MIN: CPT

## 2023-08-03 PROCEDURE — ? ADDITIONAL NOTES

## 2023-08-03 PROCEDURE — ? PRESCRIPTION MEDICATION MANAGEMENT

## 2023-08-03 PROCEDURE — ? PRESCRIPTION

## 2023-08-03 RX ORDER — CRISABOROLE 20 MG/G
1 OINTMENT TOPICAL QHS
Qty: 60 | Refills: 3 | Status: ERX | COMMUNITY
Start: 2023-08-03

## 2023-08-03 RX ADMIN — CRISABOROLE 1: 20 OINTMENT TOPICAL at 00:00

## 2023-08-03 ASSESSMENT — LOCATION SIMPLE DESCRIPTION DERM
LOCATION SIMPLE: LEFT PRETIBIAL REGION
LOCATION SIMPLE: RIGHT PRETIBIAL REGION

## 2023-08-03 ASSESSMENT — LOCATION ZONE DERM: LOCATION ZONE: LEG

## 2023-08-03 ASSESSMENT — LOCATION DETAILED DESCRIPTION DERM
LOCATION DETAILED: RIGHT DISTAL PRETIBIAL REGION
LOCATION DETAILED: LEFT PROXIMAL PRETIBIAL REGION

## 2023-08-03 NOTE — PROCEDURE: PRESCRIPTION MEDICATION MANAGEMENT
Discontinue Regimen: triamcinolone acetonide 0.1% topical cream: Apply thin layer to flare up areas BID PRN flare\\nProtopic 0.1% topical ointment: apply thin layer to lower legs qday\\nAmmonium lactate 12% lotion: apply thin layer to body qday after shower
Initiate Treatment: Eucrisa 2% topical ointment: Apply a thin layer to AA of the legs QHS
Detail Level: Zone
Render In Strict Bullet Format?: No

## 2023-08-03 NOTE — PROCEDURE: MEDICATION COUNSELING
Average Ivermectin Counseling:  Patient instructed to take medication on an empty stomach with a full glass of water.  Patient informed of potential adverse effects including but not limited to nausea, diarrhea, dizziness, itching, and swelling of the extremities or lymph nodes.  The patient verbalized understanding of the proper use and possible adverse effects of ivermectin.  All of the patient's questions and concerns were addressed.

## 2023-08-03 NOTE — PROCEDURE: MEDICATION COUNSELING
PT REQUESTS A JURY EXCUSE LETTER. FAX # [phone]  [facility] [address]  ATTN: ROXIE LOZANO   Imiquimod Counseling:  I discussed with the patient the risks of imiquimod including but not limited to erythema, scaling, itching, weeping, crusting, and pain.  Patient understands that the inflammatory response to imiquimod is variable from person to person and was educated regarded proper titration schedule.  If flu-like symptoms develop, patient knows to discontinue the medication and contact us.

## 2023-08-03 NOTE — PROCEDURE: ADDITIONAL NOTES
Additional Notes: Patient has appt with a doctor on 8/11 for lymphedema
Render Risk Assessment In Note?: yes
Detail Level: Zone

## 2024-10-07 ENCOUNTER — APPOINTMENT (RX ONLY)
Dept: URBAN - METROPOLITAN AREA CLINIC 374 | Facility: CLINIC | Age: 44
Setting detail: DERMATOLOGY
End: 2024-10-07

## 2024-10-07 DIAGNOSIS — I89.0 LYMPHEDEMA, NOT ELSEWHERE CLASSIFIED: ICD-10-CM | Status: INADEQUATELY CONTROLLED

## 2024-10-07 PROCEDURE — ? COUNSELING

## 2024-10-07 PROCEDURE — ? PRESCRIPTION

## 2024-10-07 PROCEDURE — ? PRESCRIPTION MEDICATION MANAGEMENT

## 2024-10-07 PROCEDURE — ? MEDICATION COUNSELING

## 2024-10-07 PROCEDURE — 99213 OFFICE O/P EST LOW 20 MIN: CPT

## 2024-10-07 RX ORDER — CLOBETASOL PROPIONATE 0.5 MG/G
OINTMENT TOPICAL
Qty: 60 | Refills: 5 | Status: ERX | COMMUNITY
Start: 2024-10-07

## 2024-10-07 RX ORDER — TRIAMCINOLONE ACETONIDE 1 MG/G
OINTMENT TOPICAL
Qty: 454 | Refills: 3 | Status: ERX | COMMUNITY
Start: 2024-10-07

## 2024-10-07 RX ADMIN — TRIAMCINOLONE ACETONIDE: 1 OINTMENT TOPICAL at 00:00

## 2024-10-07 RX ADMIN — CLOBETASOL PROPIONATE: 0.5 OINTMENT TOPICAL at 00:00

## 2024-10-07 ASSESSMENT — PAIN INTENSITY VAS: HOW INTENSE IS YOUR PAIN 0 BEING NO PAIN, 10 BEING THE MOST SEVERE PAIN POSSIBLE?: NO PAIN

## 2024-10-07 ASSESSMENT — SEVERITY ASSESSMENT: SEVERITY: MODERATE TO SEVERE

## 2024-10-07 ASSESSMENT — LOCATION DETAILED DESCRIPTION DERM
LOCATION DETAILED: LEFT PROXIMAL PRETIBIAL REGION
LOCATION DETAILED: RIGHT PROXIMAL PRETIBIAL REGION

## 2024-10-07 ASSESSMENT — LOCATION SIMPLE DESCRIPTION DERM
LOCATION SIMPLE: RIGHT PRETIBIAL REGION
LOCATION SIMPLE: LEFT PRETIBIAL REGION

## 2024-10-07 ASSESSMENT — LOCATION ZONE DERM: LOCATION ZONE: LEG

## 2024-10-07 NOTE — HPI: DRY SKIN
Is This A New Presentation Or A Follow-Up?: Dry Skin
How Severe Is Your Dry Skin?: mild
Additional History: Limphedema

## 2024-10-07 NOTE — PROCEDURE: PRESCRIPTION MEDICATION MANAGEMENT
Initiate Treatment: clobetasol 0.05 % topical ointment : Apply to AA of legs BID for no more than 2-3 weeks in a row\\n\\ntriamcinolone acetonide 0.1 % topical ointment : Apply to AA of skin BID for no more than 2-3 weeks in a row
Detail Level: Zone
Render In Strict Bullet Format?: No

## 2024-10-07 NOTE — PROCEDURE: MEDICATION COUNSELING
Minoxidil Pregnancy And Lactation Text: This medication has not been assigned a Pregnancy Risk Category but animal studies failed to show danger with the topical medication. It is unknown if the medication is excreted in breast milk.
Tranexamic Acid Counseling:  Patient advised of the small risk of bleeding problems with tranexamic acid. They were also instructed to call if they developed any nausea, vomiting or diarrhea. All of the patient's questions and concerns were addressed.
Topical Retinoid Pregnancy And Lactation Text: This medication is Pregnancy Category C. It is unknown if this medication is excreted in breast milk.
Tremfya Pregnancy And Lactation Text: The risk during pregnancy and breastfeeding is uncertain with this medication.
Qbrexza Counseling:  I discussed with the patient the risks of Qbrexza including but not limited to headache, mydriasis, blurred vision, dry eyes, nasal dryness, dry mouth, dry throat, dry skin, urinary hesitation, and constipation.  Local skin reactions including erythema, burning, stinging, and itching can also occur.
Hydroxyzine Counseling: Patient advised that the medication is sedating and not to drive a car after taking this medication.  Patient informed of potential adverse effects including but not limited to dry mouth, urinary retention, and blurry vision.  The patient verbalized understanding of the proper use and possible adverse effects of hydroxyzine.  All of the patient's questions and concerns were addressed.
Niacinamide Counseling: I recommended taking niacin or niacinamide, also know as vitamin B3, twice daily. Recent evidence suggests that taking vitamin B3 (500 mg twice daily) can reduce the risk of actinic keratoses and non-melanoma skin cancers. Side effects of vitamin B3 include flushing and headache.
Otezla Pregnancy And Lactation Text: This medication is Pregnancy Category C and it isn't known if it is safe during pregnancy. It is unknown if it is excreted in breast milk.
Odomzo Pregnancy And Lactation Text: This medication is Pregnancy Category X and is absolutely contraindicated during pregnancy. It is unknown if it is excreted in breast milk.
Winlevi Counseling:  I discussed with the patient the risks of topical clascoterone including but not limited to erythema, scaling, itching, and stinging. Patient voiced their understanding.
Eucrisa Counseling: Patient may experience a mild burning sensation during topical application. Eucrisa is not approved in children less than 3 months of age.
Calcipotriene Counseling:  I discussed with the patient the risks of calcipotriene including but not limited to erythema, scaling, itching, and irritation.
Topical Ketoconazole Pregnancy And Lactation Text: This medication is Pregnancy Category B and is considered safe during pregnancy. It is unknown if it is excreted in breast milk.
Skyrizi Counseling: I discussed with the patient the risks of risankizumab-rzaa including but not limited to immunosuppression, and serious infections.  The patient understands that monitoring is required including a PPD at baseline and must alert us or the primary physician if symptoms of infection or other concerning signs are noted.
Litfulo Counseling: I discussed with the patient the risks of Litfulo therapy including but not limited to upper respiratory tract infections, shingles, cold sores, and nausea. Live vaccines should be avoided.  This medication has been linked to serious infections; higher rate of mortality; malignancy and lymphoproliferative disorders; major adverse cardiovascular events; thrombosis; gastrointestinal perforations; neutropenia; lymphopenia; anemia; liver enzyme elevations; and lipid elevations.
Sarecycline Pregnancy And Lactation Text: This medication is Pregnancy Category D and not consider safe during pregnancy. It is also excreted in breast milk.
Dapsone Pregnancy And Lactation Text: This medication is Pregnancy Category C and is not considered safe during pregnancy or breast feeding.
Cephalexin Counseling: I counseled the patient regarding use of cephalexin as an antibiotic for prophylactic and/or therapeutic purposes. Cephalexin (commonly prescribed under brand name Keflex) is a cephalosporin antibiotic which is active against numerous classes of bacteria, including most skin bacteria. Side effects may include nausea, diarrhea, gastrointestinal upset, rash, hives, yeast infections, and in rare cases, hepatitis, kidney disease, seizures, fever, confusion, neurologic symptoms, and others. Patients with severe allergies to penicillin medications are cautioned that there is about a 10% incidence of cross-reactivity with cephalosporins. When possible, patients with penicillin allergies should use alternatives to cephalosporins for antibiotic therapy.
Topical Metronidazole Counseling: Metronidazole is a topical antibiotic medication. You may experience burning, stinging, redness, or allergic reactions.  Please call our office if you develop any problems from using this medication.
Calcipotriene Pregnancy And Lactation Text: The use of this medication during pregnancy or lactation is not recommended as there is insufficient data.
Metronidazole Pregnancy And Lactation Text: This medication is Pregnancy Category B and considered safe during pregnancy.  It is also excreted in breast milk.
Winlevi Pregnancy And Lactation Text: This medication is considered safe during pregnancy and breastfeeding.
Bimzelx Counseling:  I discussed with the patient the risks of Bimzelx including but not limited to depression, immunosuppression, allergic reactions and infections.  The patient understands that monitoring is required including a PPD at baseline and must alert us or the primary physician if symptoms of infection or other concerning signs are noted.
Cyclosporine Counseling:  I discussed with the patient the risks of cyclosporine including but not limited to hypertension, gingival hyperplasia,myelosuppression, immunosuppression, liver damage, kidney damage, neurotoxicity, lymphoma, and serious infections. The patient understands that monitoring is required including baseline blood pressure, CBC, CMP, lipid panel and uric acid, and then 1-2 times monthly CMP and blood pressure.
Opioid Pregnancy And Lactation Text: These medications can lead to premature delivery and should be avoided during pregnancy. These medications are also present in breast milk in small amounts.
Ketoconazole Counseling:   Patient counseled regarding improving absorption with orange juice.  Adverse effects include but are not limited to breast enlargement, headache, diarrhea, nausea, upset stomach, liver function test abnormalities, taste disturbance, and stomach pain.  There is a rare possibility of liver failure that can occur when taking ketoconazole. The patient understands that monitoring of LFTs may be required, especially at baseline. The patient verbalized understanding of the proper use and possible adverse effects of ketoconazole.  All of the patient's questions and concerns were addressed.
Spironolactone Counseling: Patient advised regarding risks of diarrhea, abdominal pain, hyperkalemia, birth defects (for female patients), liver toxicity and renal toxicity. The patient may need blood work to monitor liver and kidney function and potassium levels while on therapy. The patient verbalized understanding of the proper use and possible adverse effects of spironolactone.  All of the patient's questions and concerns were addressed.
Acitretin Pregnancy And Lactation Text: This medication is Pregnancy Category X and should not be given to women who are pregnant or may become pregnant in the future. This medication is excreted in breast milk.
Aklief Pregnancy And Lactation Text: It is unknown if this medication is safe to use during pregnancy.  It is unknown if this medication is excreted in breast milk.  Breastfeeding women should use the topical cream on the smallest area of the skin for the shortest time needed while breastfeeding.  Do not apply to nipple and areola.
Hydroxyzine Pregnancy And Lactation Text: This medication is not safe during pregnancy and should not be taken. It is also excreted in breast milk and breast feeding isn't recommended.
Cyclosporine Pregnancy And Lactation Text: This medication is Pregnancy Category C and it isn't know if it is safe during pregnancy. This medication is excreted in breast milk.
Xolair Counseling:  Patient informed of potential adverse effects including but not limited to fever, muscle aches, rash and allergic reactions.  The patient verbalized understanding of the proper use and possible adverse effects of Xolair.  All of the patient's questions and concerns were addressed.
Oxybutynin Counseling:  I discussed with the patient the risks of oxybutynin including but not limited to skin rash, drowsiness, dry mouth, difficulty urinating, and blurred vision.
Tranexamic Acid Pregnancy And Lactation Text: It is unknown if this medication is safe during pregnancy or breast feeding.
Bexarotene Counseling:  I discussed with the patient the risks of bexarotene including but not limited to hair loss, dry lips/skin/eyes, liver abnormalities, hyperlipidemia, pancreatitis, depression/suicidal ideation, photosensitivity, drug rash/allergic reactions, hypothyroidism, anemia, leukopenia, infection, cataracts, and teratogenicity.  Patient understands that they will need regular blood tests to check lipid profile, liver function tests, white blood cell count, thyroid function tests and pregnancy test if applicable.
Tazorac Counseling:  Patient advised that medication is irritating and drying.  Patient may need to apply sparingly and wash off after an hour before eventually leaving it on overnight.  The patient verbalized understanding of the proper use and possible adverse effects of tazorac.  All of the patient's questions and concerns were addressed.
Tetracycline Counseling: Patient counseled regarding possible photosensitivity and increased risk for sunburn.  Patient instructed to avoid sunlight, if possible.  When exposed to sunlight, patients should wear protective clothing, sunglasses, and sunscreen.  The patient was instructed to call the office immediately if the following severe adverse effects occur:  hearing changes, easy bruising/bleeding, severe headache, or vision changes.  The patient verbalized understanding of the proper use and possible adverse effects of tetracycline.  All of the patient's questions and concerns were addressed. Patient understands to avoid pregnancy while on therapy due to potential birth defects.
Infliximab Counseling:  I discussed with the patient the risks of infliximab including but not limited to myelosuppression, immunosuppression, autoimmune hepatitis, demyelinating diseases, lymphoma, and serious infections.  The patient understands that monitoring is required including a PPD at baseline and must alert us or the primary physician if symptoms of infection or other concerning signs are noted.
Mirvaso Counseling: Mirvaso is a topical medication which can decrease superficial blood flow where applied. Side effects are uncommon and include stinging, redness and allergic reactions.
Litfulo Pregnancy And Lactation Text: Based on animal studies, Lifulo may cause embryo-fetal harm when administered to pregnant women.  The medication should not be used in pregnancy.  Breastfeeding is not recommended during treatment.
Niacinamide Pregnancy And Lactation Text: These medications are considered safe during pregnancy.
Qbrexza Pregnancy And Lactation Text: There is no available data on Qbrexza use in pregnant women.  There is no available data on Qbrexza use in lactation.
Gabapentin Counseling: I discussed with the patient the risks of gabapentin including but not limited to dizziness, somnolence, fatigue and ataxia.
Cephalexin Pregnancy And Lactation Text: This medication is Pregnancy Category B and considered safe during pregnancy.  It is also excreted in breast milk but can be used safely for shorter doses.
Enbrel Counseling:  I discussed with the patient the risks of etanercept including but not limited to myelosuppression, immunosuppression, autoimmune hepatitis, demyelinating diseases, lymphoma, and infections.  The patient understands that monitoring is required including a PPD at baseline and must alert us or the primary physician if symptoms of infection or other concerning signs are noted.
Minocycline Counseling: Patient advised regarding possible photosensitivity and discoloration of the teeth, skin, lips, tongue and gums.  Patient instructed to avoid sunlight, if possible.  When exposed to sunlight, patients should wear protective clothing, sunglasses, and sunscreen.  The patient was instructed to call the office immediately if the following severe adverse effects occur:  hearing changes, easy bruising/bleeding, severe headache, or vision changes.  The patient verbalized understanding of the proper use and possible adverse effects of minocycline.  All of the patient's questions and concerns were addressed.
Rinvoq Counseling: I discussed with the patient the risks of Rinvoq therapy including but not limited to upper respiratory tract infections, shingles, cold sores, bronchitis, nausea, cough, fever, acne, and headache. Live vaccines should be avoided.  This medication has been linked to serious infections; higher rate of mortality; malignancy and lymphoproliferative disorders; major adverse cardiovascular events; thrombosis; thrombocytopenia, anemia, and neutropenia; lipid elevations; liver enzyme elevations; and gastrointestinal perforations.
Cantharidin Counseling:  I discussed with the patient the risks of Cantharidin including but not limited to pain, redness, burning, itching, and blistering.
Arava Counseling:  Patient counseled regarding adverse effects of Arava including but not limited to nausea, vomiting, abnormalities in liver function tests. Patients may develop mouth sores, rash, diarrhea, and abnormalities in blood counts. The patient understands that monitoring is required including LFTs and blood counts.  There is a rare possibility of scarring of the liver and lung problems that can occur when taking methotrexate. Persistent nausea, loss of appetite, pale stools, dark urine, cough, and shortness of breath should be reported immediately. Patient advised to discontinue Arava treatment and consult with a physician prior to attempting conception. The patient will have to undergo a treatment to eliminate Arava from the body prior to conception.
Mirvaso Pregnancy And Lactation Text: This medication has not been assigned a Pregnancy Risk Category. It is unknown if the medication is excreted in breast milk.
Hydroquinone Counseling:  Patient advised that medication may result in skin irritation, lightening (hypopigmentation), dryness, and burning.  In the event of skin irritation, the patient was advised to reduce the amount of the drug applied or use it less frequently.  Rarely, spots that are treated with hydroquinone can become darker (pseudoochronosis).  Should this occur, patient instructed to stop medication and call the office. The patient verbalized understanding of the proper use and possible adverse effects of hydroquinone.  All of the patient's questions and concerns were addressed.
Bimzelx Pregnancy And Lactation Text: This medication crosses the placenta and the safety is uncertain during pregnancy. It is unknown if this medication is present in breast milk.
Detail Level: Detailed
Dutasteride Male Counseling: Dustasteride Counseling:  I discussed with the patient the risks of use of dutasteride including but not limited to decreased libido, decreased ejaculate volume, and gynecomastia. Women who can become pregnant should not handle medication.  All of the patient's questions and concerns were addressed.
Topical Metronidazole Pregnancy And Lactation Text: This medication is Pregnancy Category B and considered safe during pregnancy.  It is also considered safe to use while breastfeeding.
Ketoconazole Pregnancy And Lactation Text: This medication is Pregnancy Category C and it isn't know if it is safe during pregnancy. It is also excreted in breast milk and breast feeding isn't recommended.
Spironolactone Pregnancy And Lactation Text: This medication can cause feminization of the male fetus and should be avoided during pregnancy. The active metabolite is also found in breast milk.
VTAMA Counseling: I discussed with the patient that VTAMA is not for use in the eyes, mouth or mouth. They should call the office if they develop any signs of allergic reactions to VTAMA. The patient verbalized understanding of the proper use and possible adverse effects of VTAMA.  All of the patient's questions and concerns were addressed.
Azelaic Acid Counseling: Patient counseled that medicine may cause skin irritation and to avoid applying near the eyes.  In the event of skin irritation, the patient was advised to reduce the amount of the drug applied or use it less frequently.   The patient verbalized understanding of the proper use and possible adverse effects of azelaic acid.  All of the patient's questions and concerns were addressed.
Dutasteride Female Counseling: Dutasteride Counseling:  I discussed with the patient the risks of use of dutasteride including but not limited to decreased libido and sexual dysfunction. Explained the teratogenic nature of the medication and stressed the importance of not getting pregnant during treatment. All of the patient's questions and concerns were addressed.
Valtrex Counseling: I discussed with the patient the risks of valacyclovir including but not limited to kidney damage, nausea, vomiting and severe allergy.  The patient understands that if the infection seems to be worsening or is not improving, they are to call.
Tazorac Pregnancy And Lactation Text: This medication is not safe during pregnancy. It is unknown if this medication is excreted in breast milk.
Cimzia Counseling:  I discussed with the patient the risks of Cimzia including but not limited to immunosuppression, allergic reactions and infections.  The patient understands that monitoring is required including a PPD at baseline and must alert us or the primary physician if symptoms of infection or other concerning signs are noted.
Bexarotene Pregnancy And Lactation Text: This medication is Pregnancy Category X and should not be given to women who are pregnant or may become pregnant. This medication should not be used if you are breast feeding.
Xolair Pregnancy And Lactation Text: This medication is Pregnancy Category B and is considered safe during pregnancy. This medication is excreted in breast milk.
Include Pregnancy/Lactation Warning?: No
Cantharidin Pregnancy And Lactation Text: This medication has not been proven safe during pregnancy. It is unknown if this medication is excreted in breast milk.
Methotrexate Counseling:  Patient counseled regarding adverse effects of methotrexate including but not limited to nausea, vomiting, abnormalities in liver function tests. Patients may develop mouth sores, rash, diarrhea, and abnormalities in blood counts. The patient understands that monitoring is required including LFT's and blood counts.  There is a rare possibility of scarring of the liver and lung problems that can occur when taking methotrexate. Persistent nausea, loss of appetite, pale stools, dark urine, cough, and shortness of breath should be reported immediately. Patient advised to discontinue methotrexate treatment at least three months before attempting to become pregnant.  I discussed the need for folate supplements while taking methotrexate.  These supplements can decrease side effects during methotrexate treatment. The patient verbalized understanding of the proper use and possible adverse effects of methotrexate.  All of the patient's questions and concerns were addressed.
Spevigo Counseling: I discussed with the patient the risks of Spevigo including but not limited to fatigue, nasuea, vomiting, headache, pruritus, urinary tract infection, an infusion related reactions.  The patient understands that monitoring is required including screening for tuberculosis at baseline and yearly screening thereafter while continuing Spevigo therapy. They should contact us if symptoms of infection or other concerning signs are noted.
Infliximab Pregnancy And Lactation Text: This medication is Pregnancy Category B and is considered safe during pregnancy. It is unknown if this medication is excreted in breast milk.
Gabapentin Pregnancy And Lactation Text: This medication is Pregnancy Category C and isn't considered safe during pregnancy. It is excreted in breast milk.
Nsaids Counseling: NSAID Counseling: I discussed with the patient that NSAIDs should be taken with food. Prolonged use of NSAIDs can result in the development of stomach ulcers.  Patient advised to stop taking NSAIDs if abdominal pain occurs.  The patient verbalized understanding of the proper use and possible adverse effects of NSAIDs.  All of the patient's questions and concerns were addressed.
Olumiant Counseling: I discussed with the patient the risks of Olumiant therapy including but not limited to upper respiratory tract infections, shingles, cold sores, and nausea. Live vaccines should be avoided.  This medication has been linked to serious infections; higher rate of mortality; malignancy and lymphoproliferative disorders; major adverse cardiovascular events; thrombosis; gastrointestinal perforations; neutropenia; lymphopenia; anemia; liver enzyme elevations; and lipid elevations.
Rhofade Counseling: Rhofade is a topical medication which can decrease superficial blood flow where applied. Side effects are uncommon and include stinging, redness and allergic reactions.
Olumiant Pregnancy And Lactation Text: Based on animal studies, Olumiant may cause embryo-fetal harm when administered to pregnant women.  The medication should not be used in pregnancy.  Breastfeeding is not recommended during treatment.
Rituxan Counseling:  I discussed with the patient the risks of Rituxan infusions. Side effects can include infusion reactions, severe drug rashes including mucocutaneous reactions, reactivation of latent hepatitis and other infections and rarely progressive multifocal leukoencephalopathy.  All of the patient's questions and concerns were addressed.
Vtama Pregnancy And Lactation Text: It is unknown if this medication can cause problems during pregnancy and breastfeeding.
Azathioprine Counseling:  I discussed with the patient the risks of azathioprine including but not limited to myelosuppression, immunosuppression, hepatotoxicity, lymphoma, and infections.  The patient understands that monitoring is required including baseline LFTs, Creatinine, possible TPMP genotyping and weekly CBCs for the first month and then every 2 weeks thereafter.  The patient verbalized understanding of the proper use and possible adverse effects of azathioprine.  All of the patient's questions and concerns were addressed.
Azelaic Acid Pregnancy And Lactation Text: This medication is considered safe during pregnancy and breast feeding.
Terbinafine Counseling: Patient counseling regarding adverse effects of terbinafine including but not limited to headache, diarrhea, rash, upset stomach, liver function test abnormalities, itching, taste/smell disturbance, nausea, abdominal pain, and flatulence.  There is a rare possibility of liver failure that can occur when taking terbinafine.  The patient understands that a baseline LFT and kidney function test may be required. The patient verbalized understanding of the proper use and possible adverse effects of terbinafine.  All of the patient's questions and concerns were addressed.
Clindamycin Counseling: I counseled the patient regarding use of clindamycin as an antibiotic for prophylactic and/or therapeutic purposes. Clindamycin is active against numerous classes of bacteria, including skin bacteria. Side effects may include nausea, diarrhea, gastrointestinal upset, rash, hives, yeast infections, and in rare cases, colitis.
Topical Steroids Counseling: I discussed with the patient that prolonged use of topical steroids can result in the increased appearance of superficial blood vessels (telangiectasias), lightening (hypopigmentation) and thinning of the skin (atrophy).  Patient understands to avoid using high potency steroids in skin folds, the groin or the face.  The patient verbalized understanding of the proper use and possible adverse effects of topical steroids.  All of the patient's questions and concerns were addressed.
Albendazole Counseling:  I discussed with the patient the risks of albendazole including but not limited to cytopenia, kidney damage, nausea/vomiting and severe allergy.  The patient understands that this medication is being used in an off-label manner.
Rinvoq Pregnancy And Lactation Text: Based on animal studies, Rinvoq may cause embryo-fetal harm when administered to pregnant women.  The medication should not be used in pregnancy.  Breastfeeding is not recommended during treatment and for 6 days after the last dose.
5-Fu Counseling: 5-Fluorouracil Counseling:  I discussed with the patient the risks of 5-fluorouracil including but not limited to erythema, scaling, itching, weeping, crusting, and pain.
Fluconazole Counseling:  Patient counseled regarding adverse effects of fluconazole including but not limited to headache, diarrhea, nausea, upset stomach, liver function test abnormalities, taste disturbance, and stomach pain.  There is a rare possibility of liver failure that can occur when taking fluconazole.  The patient understands that monitoring of LFTs and kidney function test may be required, especially at baseline. The patient verbalized understanding of the proper use and possible adverse effects of fluconazole.  All of the patient's questions and concerns were addressed.
Topical Clindamycin Counseling: Patient counseled that this medication may cause skin irritation or allergic reactions.  In the event of skin irritation, the patient was advised to reduce the amount of the drug applied or use it less frequently.   The patient verbalized understanding of the proper use and possible adverse effects of clindamycin.  All of the patient's questions and concerns were addressed.
Dutasteride Pregnancy And Lactation Text: This medication is absolutely contraindicated in women, especially during pregnancy and breast feeding. Feminization of male fetuses is possible if taking while pregnant.
Isotretinoin Counseling: Patient should get monthly blood tests, not donate blood, not drive at night if vision affected, not share medication, and not undergo elective surgery for 6 months after tx completed. Side effects reviewed, pt to contact office should one occur.
Terbinafine Pregnancy And Lactation Text: This medication is Pregnancy Category B and is considered safe during pregnancy. It is also excreted in breast milk and breast feeding isn't recommended.
Benzoyl Peroxide Counseling: Patient counseled that medicine may cause skin irritation and bleach clothing.  In the event of skin irritation, the patient was advised to reduce the amount of the drug applied or use it less frequently.   The patient verbalized understanding of the proper use and possible adverse effects of benzoyl peroxide.  All of the patient's questions and concerns were addressed.
Clindamycin Pregnancy And Lactation Text: This medication can be used in pregnancy if certain situations. Clindamycin is also present in breast milk.
Spevigo Pregnancy And Lactation Text: The risk during pregnancy and breastfeeding is uncertain with this medication. This medication does cross the placenta. It is unknown if this medication is found in breast milk.
Nsaids Pregnancy And Lactation Text: These medications are considered safe up to 30 weeks gestation. It is excreted in breast milk.
Propranolol Counseling:  I discussed with the patient the risks of propranolol including but not limited to low heart rate, low blood pressure, low blood sugar, restlessness and increased cold sensitivity. They should call the office if they experience any of these side effects.
Opzelura Counseling:  I discussed with the patient the risks of Opzelura including but not limited to nasopharngitis, bronchitis, ear infection, eosinophila, hives, diarrhea, folliculitis, tonsillitis, and rhinorrhea.  Taken orally, this medication has been linked to serious infections; higher rate of mortality; malignancy and lymphoproliferative disorders; major adverse cardiovascular events; thrombosis; thrombocytopenia, anemia, and neutropenia; and lipid elevations.
Glycopyrrolate Counseling:  I discussed with the patient the risks of glycopyrrolate including but not limited to skin rash, drowsiness, dry mouth, difficulty urinating, and blurred vision.
Methotrexate Pregnancy And Lactation Text: This medication is Pregnancy Category X and is known to cause fetal harm. This medication is excreted in breast milk.
Valtrex Pregnancy And Lactation Text: this medication is Pregnancy Category B and is considered safe during pregnancy. This medication is not directly found in breast milk but it's metabolite acyclovir is present.
Cimzia Pregnancy And Lactation Text: This medication crosses the placenta but can be considered safe in certain situations. Cimzia may be excreted in breast milk.
Imiquimod Counseling:  I discussed with the patient the risks of imiquimod including but not limited to erythema, scaling, itching, weeping, crusting, and pain.  Patient understands that the inflammatory response to imiquimod is variable from person to person and was educated regarded proper titration schedule.  If flu-like symptoms develop, patient knows to discontinue the medication and contact us.
Clofazimine Counseling:  I discussed with the patient the risks of clofazimine including but not limited to skin and eye pigmentation, liver damage, nausea/vomiting, gastrointestinal bleeding and allergy.
Glycopyrrolate Pregnancy And Lactation Text: This medication is Pregnancy Category B and is considered safe during pregnancy. It is unknown if it is excreted breast milk.
5-Fu Pregnancy And Lactation Text: This medication is Pregnancy Category X and contraindicated in pregnancy and in women who may become pregnant. It is unknown if this medication is excreted in breast milk.
Opzelura Pregnancy And Lactation Text: There is insufficient data to evaluate drug-associated risk for major birth defects, miscarriage, or other adverse maternal or fetal outcomes.  There is a pregnancy registry that monitors pregnancy outcomes in pregnant persons exposed to the medication during pregnancy.  It is unknown if this medication is excreted in breast milk.  Do not breastfeed during treatment and for about 4 weeks after the last dose.
Propranolol Pregnancy And Lactation Text: This medication is Pregnancy Category C and it isn't known if it is safe during pregnancy. It is excreted in breast milk.
Solaraze Counseling:  I discussed with the patient the risks of Solaraze including but not limited to erythema, scaling, itching, weeping, crusting, and pain.
Rituxan Pregnancy And Lactation Text: This medication is Pregnancy Category C and it isn't know if it is safe during pregnancy. It is unknown if this medication is excreted in breast milk but similar antibodies are known to be excreted.
Olanzapine Counseling- I discussed with the patient the common side effects of olanzapine including but are not limited to: lack of energy, dry mouth, increased appetite, sleepiness, tremor, constipation, dizziness, changes in behavior, or restlessness.  Explained that teenagers are more likely to experience headaches, abdominal pain, pain in the arms or legs, tiredness, and sleepiness.  Serious side effects include but are not limited: increased risk of death in elderly patients who are confused, have memory loss, or dementia-related psychosis; hyperglycemia; increased cholesterol and triglycerides; and weight gain.
Stelara Counseling:  I discussed with the patient the risks of ustekinumab including but not limited to immunosuppression, malignancy, posterior leukoencephalopathy syndrome, and serious infections.  The patient understands that monitoring is required including a PPD at baseline and must alert us or the primary physician if symptoms of infection or other concerning signs are noted.
Sotyktu Counseling:  I discussed the most common side effects of Sotyktu including: common cold, sore throat, sinus infections, cold sores, canker sores, folliculitis, and acne.  I also discussed more serious side effects of Sotyktu including but not limited to: serious allergic reactions; increased risk for infections such as TB; cancers such as lymphomas; rhabdomyolysis and elevated CPK; and elevated triglycerides and liver enzymes. 
Zoryve Counseling:  I discussed with the patient that Zoryve is not for use in the eyes, mouth or vagina. The most commonly reported side effects include diarrhea, headache, insomnia, application site pain, upper respiratory tract infections, and urinary tract infections.  All of the patient's questions and concerns were addressed.
Albendazole Pregnancy And Lactation Text: This medication is Pregnancy Category C and it isn't known if it is safe during pregnancy. It is also excreted in breast milk.
Topical Steroids Applications Pregnancy And Lactation Text: Most topical steroids are considered safe to use during pregnancy and lactation.  Any topical steroid applied to the breast or nipple should be washed off before breastfeeding.
Erivedge Counseling- I discussed with the patient the risks of Erivedge including but not limited to nausea, vomiting, diarrhea, constipation, weight loss, changes in the sense of taste, decreased appetite, muscle spasms, and hair loss.  The patient verbalized understanding of the proper use and possible adverse effects of Erivedge.  All of the patient's questions and concerns were addressed.
Humira Counseling:  I discussed with the patient the risks of adalimumab including but not limited to myelosuppression, immunosuppression, autoimmune hepatitis, demyelinating diseases, lymphoma, and serious infections.  The patient understands that monitoring is required including a PPD at baseline and must alert us or the primary physician if symptoms of infection or other concerning signs are noted.
Quinolones Counseling:  I discussed with the patient the risks of fluoroquinolones including but not limited to GI upset, allergic reaction, drug rash, diarrhea, dizziness, photosensitivity, yeast infections, liver function test abnormalities, tendonitis/tendon rupture.
Ivermectin Counseling:  Patient instructed to take medication on an empty stomach with a full glass of water.  Patient informed of potential adverse effects including but not limited to nausea, diarrhea, dizziness, itching, and swelling of the extremities or lymph nodes.  The patient verbalized understanding of the proper use and possible adverse effects of ivermectin.  All of the patient's questions and concerns were addressed.
Doxycycline Counseling:  Patient counseled regarding possible photosensitivity and increased risk for sunburn.  Patient instructed to avoid sunlight, if possible.  When exposed to sunlight, patients should wear protective clothing, sunglasses, and sunscreen.  The patient was instructed to call the office immediately if the following severe adverse effects occur:  hearing changes, easy bruising/bleeding, severe headache, or vision changes.  The patient verbalized understanding of the proper use and possible adverse effects of doxycycline.  All of the patient's questions and concerns were addressed.
Topical Sulfur Applications Counseling: Topical Sulfur Counseling: Patient counseled that this medication may cause skin irritation or allergic reactions.  In the event of skin irritation, the patient was advised to reduce the amount of the drug applied or use it less frequently.   The patient verbalized understanding of the proper use and possible adverse effects of topical sulfur application.  All of the patient's questions and concerns were addressed.
Quinolones Pregnancy And Lactation Text: This medication is Pregnancy Category C and it isn't know if it is safe during pregnancy. It is also excreted in breast milk.
Prednisone Counseling:  I discussed with the patient the risks of prolonged use of prednisone including but not limited to weight gain, insomnia, osteoporosis, mood changes, diabetes, susceptibility to infection, glaucoma and high blood pressure.  In cases where prednisone use is prolonged, patients should be monitored with blood pressure checks, serum glucose levels and an eye exam.  Additionally, the patient may need to be placed on GI prophylaxis, PCP prophylaxis, and calcium and vitamin D supplementation and/or a bisphosphonate.  The patient verbalized understanding of the proper use and the possible adverse effects of prednisone.  All of the patient's questions and concerns were addressed.
Azathioprine Pregnancy And Lactation Text: This medication is Pregnancy Category D and isn't considered safe during pregnancy. It is unknown if this medication is excreted in breast milk.
Finasteride Male Counseling: Finasteride Counseling:  I discussed with the patient the risks of use of finasteride including but not limited to decreased libido, decreased ejaculate volume, gynecomastia, and depression. Women should not handle medication.  All of the patient's questions and concerns were addressed.
Isotretinoin Pregnancy And Lactation Text: This medication is Pregnancy Category X and is considered extremely dangerous during pregnancy. It is unknown if it is excreted in breast milk.
Cosentyx Counseling:  I discussed with the patient the risks of Cosentyx including but not limited to worsening of Crohn's disease, immunosuppression, allergic reactions and infections.  The patient understands that monitoring is required including a PPD at baseline and must alert us or the primary physician if symptoms of infection or other concerning signs are noted.
Cimetidine Counseling:  I discussed with the patient the risks of Cimetidine including but not limited to gynecomastia, headache, diarrhea, nausea, drowsiness, arrhythmias, pancreatitis, skin rashes, psychosis, bone marrow suppression and kidney toxicity.
Cellcept Counseling:  I discussed with the patient the risks of mycophenolate mofetil including but not limited to infection/immunosuppression, GI upset, hypokalemia, hypercholesterolemia, bone marrow suppression, lymphoproliferative disorders, malignancy, GI ulceration/bleed/perforation, colitis, interstitial lung disease, kidney failure, progressive multifocal leukoencephalopathy, and birth defects.  The patient understands that monitoring is required including a baseline creatinine and regular CBC testing. In addition, patient must alert us immediately if symptoms of infection or other concerning signs are noted.
Picato Counseling:  I discussed with the patient the risks of Picato including but not limited to erythema, scaling, itching, weeping, crusting, and pain.
Solaraze Pregnancy And Lactation Text: This medication is Pregnancy Category B and is considered safe. There is some data to suggest avoiding during the third trimester. It is unknown if this medication is excreted in breast milk.
SSKI Counseling:  I discussed with the patient the risks of SSKI including but not limited to thyroid abnormalities, metallic taste, GI upset, fever, headache, acne, arthralgias, paraesthesias, lymphadenopathy, easy bleeding, arrhythmias, and allergic reaction.
Sotyktu Pregnancy And Lactation Text: There is insufficient data to evaluate whether or not Sotyktu is safe to use during pregnancy.   It is not known if Sotyktu passes into breast milk and whether or not it is safe to use when breastfeeding.  
Drysol Counseling:  I discussed with the patient the risks of drysol/aluminum chloride including but not limited to skin rash, itching, irritation, burning.
Olanzapine Pregnancy And Lactation Text: This medication is pregnancy category C.   There are no adequate and well controlled trials with olanzapine in pregnant females.  Olanzapine should be used during pregnancy only if the potential benefit justifies the potential risk to the fetus.   In a study in lactating healthy women, olanzapine was excreted in breast milk.  It is recommended that women taking olanzapine should not breast feed.
Siliq Counseling:  I discussed with the patient the risks of Siliq including but not limited to new or worsening depression, suicidal thoughts and behavior, immunosuppression, malignancy, posterior leukoencephalopathy syndrome, and serious infections.  The patient understands that monitoring is required including a PPD at baseline and must alert us or the primary physician if symptoms of infection or other concerning signs are noted. There is also a special program designed to monitor depression which is required with Siliq.
Hydroxychloroquine Counseling:  I discussed with the patient that a baseline ophthalmologic exam is needed at the start of therapy and every year thereafter while on therapy. A CBC may also be warranted for monitoring.  The side effects of this medication were discussed with the patient, including but not limited to agranulocytosis, aplastic anemia, seizures, rashes, retinopathy, and liver toxicity. Patient instructed to call the office should any adverse effect occur.  The patient verbalized understanding of the proper use and possible adverse effects of Plaquenil.  All the patient's questions and concerns were addressed.
Doxycycline Pregnancy And Lactation Text: This medication is Pregnancy Category D and not consider safe during pregnancy. It is also excreted in breast milk but is considered safe for shorter treatment courses.
Zyclara Counseling:  I discussed with the patient the risks of imiquimod including but not limited to erythema, scaling, itching, weeping, crusting, and pain.  Patient understands that the inflammatory response to imiquimod is variable from person to person and was educated regarded proper titration schedule.  If flu-like symptoms develop, patient knows to discontinue the medication and contact us.
Xeljanz Counseling: I discussed with the patient the risks of Xeljanz therapy including increased risk of infection, liver issues, headache, diarrhea, or cold symptoms. Live vaccines should be avoided. They were instructed to call if they have any problems.
Benzoyl Peroxide Pregnancy And Lactation Text: This medication is Pregnancy Category C. It is unknown if benzoyl peroxide is excreted in breast milk.
Hyrimoz Counseling:  I discussed with the patient the risks of adalimumab including but not limited to myelosuppression, immunosuppression, autoimmune hepatitis, demyelinating diseases, lymphoma, and serious infections.  The patient understands that monitoring is required including a PPD at baseline and must alert us or the primary physician if symptoms of infection or other concerning signs are noted.
Colchicine Counseling:  Patient counseled regarding adverse effects including but not limited to stomach upset (nausea, vomiting, stomach pain, or diarrhea).  Patient instructed to limit alcohol consumption while taking this medication.  Colchicine may reduce blood counts especially with prolonged use.  The patient understands that monitoring of kidney function and blood counts may be required, especially at baseline. The patient verbalized understanding of the proper use and possible adverse effects of colchicine.  All of the patient's questions and concerns were addressed.
Libtayo Counseling- I discussed with the patient the risks of Libtayo including but not limited to nausea, vomiting, diarrhea, and bone or muscle pain.  The patient verbalized understanding of the proper use and possible adverse effects of Libtayo.  All of the patient's questions and concerns were addressed.
Topical Sulfur Applications Pregnancy And Lactation Text: This medication is considered safe during pregnancy and breast feeding secondary to limited systemic absorption.
High Dose Vitamin A Counseling: Side effects reviewed, pt to contact office should one occur.
Finasteride Female Counseling: Finasteride Counseling:  I discussed with the patient the risks of use of finasteride including but not limited to decreased libido and sexual dysfunction. Explained the teratogenic nature of the medication and stressed the importance of not getting pregnant during treatment. All of the patient's questions and concerns were addressed.
Griseofulvin Counseling:  I discussed with the patient the risks of griseofulvin including but not limited to photosensitivity, cytopenia, liver damage, nausea/vomiting and severe allergy.  The patient understands that this medication is best absorbed when taken with a fatty meal (e.g., ice cream or french fries).
Azithromycin Counseling:  I discussed with the patient the risks of azithromycin including but not limited to GI upset, allergic reaction, drug rash, diarrhea, and yeast infections.
Sski Pregnancy And Lactation Text: This medication is Pregnancy Category D and isn't considered safe during pregnancy. It is excreted in breast milk.
Taltz Counseling: I discussed with the patient the risks of ixekizumab including but not limited to immunosuppression, serious infections, worsening of inflammatory bowel disease and drug reactions.  The patient understands that monitoring is required including a PPD at baseline and must alert us or the primary physician if symptoms of infection or other concerning signs are noted.
Oral Minoxidil Counseling- I discussed with the patient the risks of oral minoxidil including but not limited to shortness of breath, swelling of the feet or ankles, dizziness, lightheadedness, unwanted hair growth and allergic reaction.  The patient verbalized understanding of the proper use and possible adverse effects of oral minoxidil.  All of the patient's questions and concerns were addressed.
Griseofulvin Pregnancy And Lactation Text: This medication is Pregnancy Category X and is known to cause serious birth defects. It is unknown if this medication is excreted in breast milk but breast feeding should be avoided.
Rifampin Counseling: I discussed with the patient the risks of rifampin including but not limited to liver damage, kidney damage, red-orange body fluids, nausea/vomiting and severe allergy.
Hydroxychloroquine Pregnancy And Lactation Text: This medication has been shown to cause fetal harm but it isn't assigned a Pregnancy Risk Category. There are small amounts excreted in breast milk.
Klisyri Counseling:  I discussed with the patient the risks of Klisyri including but not limited to erythema, scaling, itching, weeping, crusting, and pain.
Soolantra Counseling: I discussed with the patients the risks of topial Soolantra. This is a medicine which decreases the number of mites and inflammation in the skin. You experience burning, stinging, eye irritation or allergic reactions.  Please call our office if you develop any problems from using this medication.
Cibinqo Counseling: I discussed with the patient the risks of Cibinqo therapy including but not limited to common cold, nausea, headache, cold sores, increased blood CPK levels, dizziness, UTIs, fatigue, acne, and vomitting. Live vaccines should be avoided.  This medication has been linked to serious infections; higher rate of mortality; malignancy and lymphoproliferative disorders; major adverse cardiovascular events; thrombosis; thrombocytopenia and lymphopenia; lipid elevations; and retinal detachment.
Rifampin Pregnancy And Lactation Text: This medication is Pregnancy Category C and it isn't know if it is safe during pregnancy. It is also excreted in breast milk and should not be used if you are breast feeding.
Xellachellez Pregnancy And Lactation Text: This medication is Pregnancy Category D and is not considered safe during pregnancy.  The risk during breast feeding is also uncertain.
Klisyri Pregnancy And Lactation Text: It is unknown if this medication can harm a developing fetus or if it is excreted in breast milk.
Elidel Counseling: Patient may experience a mild burning sensation during topical application. Elidel is not approved in children less than 2 years of age. There have been case reports of hematologic and skin malignancies in patients using topical calcineurin inhibitors although causality is questionable.
Libtayo Pregnancy And Lactation Text: This medication is contraindicated in pregnancy and when breast feeding.
High Dose Vitamin A Pregnancy And Lactation Text: High dose vitamin A therapy is contraindicated during pregnancy and breast feeding.
Azithromycin Pregnancy And Lactation Text: This medication is considered safe during pregnancy and is also secreted in breast milk.
Erythromycin Counseling:  I discussed with the patient the risks of erythromycin including but not limited to GI upset, allergic reaction, drug rash, diarrhea, increase in liver enzymes, and yeast infections.
Finasteride Pregnancy And Lactation Text: This medication is absolutely contraindicated during pregnancy. It is unknown if it is excreted in breast milk.
Dupixent Counseling: I discussed with the patient the risks of dupilumab including but not limited to eye inflammation and irritation, cold sores, injection site reactions, allergic reactions and increased risk of parasitic infection. The patient understands that monitoring is required and they must alert us or the primary physician if symptoms of infection or other concerning signs are noted.
Wartpeel Counseling:  I discussed with the patient the risks of Wartpeel including but not limited to erythema, scaling, itching, weeping, crusting, and pain.
Carac Counseling:  I discussed with the patient the risks of Carac including but not limited to erythema, scaling, itching, weeping, crusting, and pain.
Birth Control Pills Counseling: Birth Control Pill Counseling: I discussed with the patient the potential side effects of OCPs including but not limited to increased risk of stroke, heart attack, thrombophlebitis, deep venous thrombosis, hepatic adenomas, breast changes, GI upset, headaches, and depression.  The patient verbalized understanding of the proper use and possible adverse effects of OCPs. All of the patient's questions and concerns were addressed.
Thalidomide Counseling: I discussed with the patient the risks of thalidomide including but not limited to birth defects, anxiety, weakness, chest pain, dizziness, cough and severe allergy.
Soolantra Pregnancy And Lactation Text: This medication is Pregnancy Category C. This medication is considered safe during breast feeding.
Bactrim Counseling:  I discussed with the patient the risks of sulfa antibiotics including but not limited to GI upset, allergic reaction, drug rash, diarrhea, dizziness, photosensitivity, and yeast infections.  Rarely, more serious reactions can occur including but not limited to aplastic anemia, agranulocytosis, methemoglobinemia, blood dyscrasias, liver or kidney failure, lung infiltrates or desquamative/blistering drug rashes.
Dupixent Pregnancy And Lactation Text: This medication likely crosses the placenta but the risk for the fetus is uncertain. This medication is excreted in breast milk.
Itraconazole Counseling:  I discussed with the patient the risks of itraconazole including but not limited to liver damage, nausea/vomiting, neuropathy, and severe allergy.  The patient understands that this medication is best absorbed when taken with acidic beverages such as non-diet cola or ginger ale.  The patient understands that monitoring is required including baseline LFTs and repeat LFTs at intervals.  The patient understands that they are to contact us or the primary physician if concerning signs are noted.
Protopic Counseling: Patient may experience a mild burning sensation during topical application. Protopic is not approved in children less than 2 years of age. There have been case reports of hematologic and skin malignancies in patients using topical calcineurin inhibitors although causality is questionable.
Oral Minoxidil Pregnancy And Lactation Text: This medication should only be used when clearly needed if you are pregnant, attempting to become pregnant or breast feeding.
Doxepin Counseling:  Patient advised that the medication is sedating and not to drive a car after taking this medication. Patient informed of potential adverse effects including but not limited to dry mouth, urinary retention, and blurry vision.  The patient verbalized understanding of the proper use and possible adverse effects of doxepin.  All of the patient's questions and concerns were addressed.
Simponi Counseling:  I discussed with the patient the risks of golimumab including but not limited to myelosuppression, immunosuppression, autoimmune hepatitis, demyelinating diseases, lymphoma, and serious infections.  The patient understands that monitoring is required including a PPD at baseline and must alert us or the primary physician if symptoms of infection or other concerning signs are noted.
Low Dose Naltrexone Counseling- I discussed with the patient the potential risks and side effects of low dose naltrexone including but not limited to: more vivid dreams, headaches, nausea, vomiting, abdominal pain, fatigue, dizziness, and anxiety.
Adbry Counseling: I discussed with the patient the risks of tralokinumab including but not limited to eye infection and irritation, cold sores, injection site reactions, worsening of asthma, allergic reactions and increased risk of parasitic infection.  Live vaccines should be avoided while taking tralokinumab. The patient understands that monitoring is required and they must alert us or the primary physician if symptoms of infection or other concerning signs are noted.
Cyclophosphamide Counseling:  I discussed with the patient the risks of cyclophosphamide including but not limited to hair loss, hormonal abnormalities, decreased fertility, abdominal pain, diarrhea, nausea and vomiting, bone marrow suppression and infection. The patient understands that monitoring is required while taking this medication.
Dapsone Counseling: I discussed with the patient the risks of dapsone including but not limited to hemolytic anemia, agranulocytosis, rashes, methemoglobinemia, kidney failure, peripheral neuropathy, headaches, GI upset, and liver toxicity.  Patients who start dapsone require monitoring including baseline LFTs and weekly CBCs for the first month, then every month thereafter.  The patient verbalized understanding of the proper use and possible adverse effects of dapsone.  All of the patient's questions and concerns were addressed.
Ilumya Counseling: I discussed with the patient the risks of tildrakizumab including but not limited to immunosuppression, malignancy, posterior leukoencephalopathy syndrome, and serious infections.  The patient understands that monitoring is required including a PPD at baseline and must alert us or the primary physician if symptoms of infection or other concerning signs are noted.
Low Dose Naltrexone Pregnancy And Lactation Text: Naltrexone is pregnancy category C.  There have been no adequate and well-controlled studies in pregnant women.  It should be used in pregnancy only if the potential benefit justifies the potential risk to the fetus.   Limited data indicates that naltrexone is minimally excreted into breastmilk.
Cibinqo Pregnancy And Lactation Text: It is unknown if this medication will adversely affect pregnancy or breast feeding.  You should not take this medication if you are currently pregnant or planning a pregnancy or while breastfeeding.
Minoxidil Counseling: Minoxidil is a topical medication which can increase blood flow where it is applied. It is uncertain how this medication increases hair growth. Side effects are uncommon and include stinging and allergic reactions.
Doxepin Pregnancy And Lactation Text: This medication is Pregnancy Category C and it isn't known if it is safe during pregnancy. It is also excreted in breast milk and breast feeding isn't recommended.
Tremfya Counseling: I discussed with the patient the risks of guselkumab including but not limited to immunosuppression, serious infections, and drug reactions.  The patient understands that monitoring is required including a PPD at baseline and must alert us or the primary physician if symptoms of infection or other concerning signs are noted.
Topical Ketoconazole Counseling: Patient counseled that this medication may cause skin irritation or allergic reactions.  In the event of skin irritation, the patient was advised to reduce the amount of the drug applied or use it less frequently.   The patient verbalized understanding of the proper use and possible adverse effects of ketoconazole.  All of the patient's questions and concerns were addressed.
Erythromycin Pregnancy And Lactation Text: This medication is Pregnancy Category B and is considered safe during pregnancy. It is also excreted in breast milk.
Sarecycline Counseling: Patient advised regarding possible photosensitivity and discoloration of the teeth, skin, lips, tongue and gums.  Patient instructed to avoid sunlight, if possible.  When exposed to sunlight, patients should wear protective clothing, sunglasses, and sunscreen.  The patient was instructed to call the office immediately if the following severe adverse effects occur:  hearing changes, easy bruising/bleeding, severe headache, or vision changes.  The patient verbalized understanding of the proper use and possible adverse effects of sarecycline.  All of the patient's questions and concerns were addressed.
Odomzo Counseling- I discussed with the patient the risks of Odomzo including but not limited to nausea, vomiting, diarrhea, constipation, weight loss, changes in the sense of taste, decreased appetite, muscle spasms, and hair loss.  The patient verbalized understanding of the proper use and possible adverse effects of Odomzo.  All of the patient's questions and concerns were addressed.
Aklief counseling:  Patient advised to apply a pea-sized amount only at bedtime and wait 30 minutes after washing their face before applying.  If too drying, patient may add a non-comedogenic moisturizer.  The most commonly reported side effects including irritation, redness, scaling, dryness, stinging, burning, itching, and increased risk of sunburn.  The patient verbalized understanding of the proper use and possible adverse effects of retinoids.  All of the patient's questions and concerns were addressed.
Bactrim Pregnancy And Lactation Text: This medication is Pregnancy Category D and is known to cause fetal risk.  It is also excreted in breast milk.
Metronidazole Counseling:  I discussed with the patient the risks of metronidazole including but not limited to seizures, nausea/vomiting, a metallic taste in the mouth, nausea/vomiting and severe allergy.
Birth Control Pills Pregnancy And Lactation Text: This medication should be avoided if pregnant and for the first 30 days post-partum.
Protopic Pregnancy And Lactation Text: This medication is Pregnancy Category C. It is unknown if this medication is excreted in breast milk when applied topically.
Otezla Counseling: The side effects of Otezla were discussed with the patient, including but not limited to worsening or new depression, weight loss, diarrhea, nausea, upper respiratory tract infection, and headache. Patient instructed to call the office should any adverse effect occur.  The patient verbalized understanding of the proper use and possible adverse effects of Otezla.  All the patient's questions and concerns were addressed.
Topical Retinoid counseling:  Patient advised to apply a pea-sized amount only at bedtime and wait 30 minutes after washing their face before applying.  If too drying, patient may add a non-comedogenic moisturizer. The patient verbalized understanding of the proper use and possible adverse effects of retinoids.  All of the patient's questions and concerns were addressed.
Cyclophosphamide Pregnancy And Lactation Text: This medication is Pregnancy Category D and it isn't considered safe during pregnancy. This medication is excreted in breast milk.
Acitretin Counseling:  I discussed with the patient the risks of acitretin including but not limited to hair loss, dry lips/skin/eyes, liver damage, hyperlipidemia, depression/suicidal ideation, photosensitivity.  Serious rare side effects can include but are not limited to pancreatitis, pseudotumor cerebri, bony changes, clot formation/stroke/heart attack.  Patient understands that alcohol is contraindicated since it can result in liver toxicity and significantly prolong the elimination of the drug by many years.
Adbry Pregnancy And Lactation Text: It is unknown if this medication will adversely affect pregnancy or breast feeding.
Opioid Counseling: I discussed with the patient the potential side effects of opioids including but not limited to addiction, altered mental status, and depression. I stressed avoiding alcohol, benzodiazepines, muscle relaxants and sleep aids unless specifically okayed by a physician. The patient verbalized understanding of the proper use and possible adverse effects of opioids. All of the patient's questions and concerns were addressed. They were instructed to flush the remaining pills down the toilet if they did not need them for pain.